# Patient Record
Sex: FEMALE | Race: BLACK OR AFRICAN AMERICAN | NOT HISPANIC OR LATINO | URBAN - METROPOLITAN AREA
[De-identification: names, ages, dates, MRNs, and addresses within clinical notes are randomized per-mention and may not be internally consistent; named-entity substitution may affect disease eponyms.]

---

## 2018-01-01 ENCOUNTER — INPATIENT (INPATIENT)
Facility: HOSPITAL | Age: 63
LOS: 12 days | End: 2019-01-02
Attending: INTERNAL MEDICINE | Admitting: INTERNAL MEDICINE
Payer: MEDICAID

## 2018-01-01 VITALS
TEMPERATURE: 98 F | OXYGEN SATURATION: 98 % | SYSTOLIC BLOOD PRESSURE: 101 MMHG | RESPIRATION RATE: 16 BRPM | HEART RATE: 93 BPM | DIASTOLIC BLOOD PRESSURE: 80 MMHG

## 2018-01-01 DIAGNOSIS — Z51.5 ENCOUNTER FOR PALLIATIVE CARE: ICD-10-CM

## 2018-01-01 DIAGNOSIS — R52 PAIN, UNSPECIFIED: ICD-10-CM

## 2018-01-01 DIAGNOSIS — C50.919 MALIGNANT NEOPLASM OF UNSPECIFIED SITE OF UNSPECIFIED FEMALE BREAST: ICD-10-CM

## 2018-01-01 DIAGNOSIS — Z29.9 ENCOUNTER FOR PROPHYLACTIC MEASURES, UNSPECIFIED: ICD-10-CM

## 2018-01-01 DIAGNOSIS — Z71.89 OTHER SPECIFIED COUNSELING: ICD-10-CM

## 2018-01-01 DIAGNOSIS — G93.41 METABOLIC ENCEPHALOPATHY: ICD-10-CM

## 2018-01-01 DIAGNOSIS — R65.10 SYSTEMIC INFLAMMATORY RESPONSE SYNDROME (SIRS) OF NON-INFECTIOUS ORIGIN WITHOUT ACUTE ORGAN DYSFUNCTION: ICD-10-CM

## 2018-01-01 DIAGNOSIS — A41.9 SEPSIS, UNSPECIFIED ORGANISM: ICD-10-CM

## 2018-01-01 DIAGNOSIS — Z79.899 OTHER LONG TERM (CURRENT) DRUG THERAPY: ICD-10-CM

## 2018-01-01 DIAGNOSIS — R74.8 ABNORMAL LEVELS OF OTHER SERUM ENZYMES: ICD-10-CM

## 2018-01-01 DIAGNOSIS — K59.00 CONSTIPATION, UNSPECIFIED: ICD-10-CM

## 2018-01-01 DIAGNOSIS — Z90.11 ACQUIRED ABSENCE OF RIGHT BREAST AND NIPPLE: Chronic | ICD-10-CM

## 2018-01-01 DIAGNOSIS — I10 ESSENTIAL (PRIMARY) HYPERTENSION: ICD-10-CM

## 2018-01-01 DIAGNOSIS — B37.0 CANDIDAL STOMATITIS: ICD-10-CM

## 2018-01-01 DIAGNOSIS — K59.01 SLOW TRANSIT CONSTIPATION: ICD-10-CM

## 2018-01-01 DIAGNOSIS — R53.1 WEAKNESS: ICD-10-CM

## 2018-01-01 LAB
-  AMIKACIN: SIGNIFICANT CHANGE UP
-  AMIKACIN: SIGNIFICANT CHANGE UP
-  AMPICILLIN/SULBACTAM: SIGNIFICANT CHANGE UP
-  AMPICILLIN/SULBACTAM: SIGNIFICANT CHANGE UP
-  AMPICILLIN: SIGNIFICANT CHANGE UP
-  AMPICILLIN: SIGNIFICANT CHANGE UP
-  AZTREONAM: SIGNIFICANT CHANGE UP
-  AZTREONAM: SIGNIFICANT CHANGE UP
-  CEFAZOLIN: SIGNIFICANT CHANGE UP
-  CEFAZOLIN: SIGNIFICANT CHANGE UP
-  CEFEPIME: SIGNIFICANT CHANGE UP
-  CEFEPIME: SIGNIFICANT CHANGE UP
-  CEFOXITIN: SIGNIFICANT CHANGE UP
-  CEFOXITIN: SIGNIFICANT CHANGE UP
-  CEFTAZIDIME: SIGNIFICANT CHANGE UP
-  CEFTAZIDIME: SIGNIFICANT CHANGE UP
-  CEFTRIAXONE: SIGNIFICANT CHANGE UP
-  CEFTRIAXONE: SIGNIFICANT CHANGE UP
-  CIPROFLOXACIN: SIGNIFICANT CHANGE UP
-  CIPROFLOXACIN: SIGNIFICANT CHANGE UP
-  ERTAPENEM: SIGNIFICANT CHANGE UP
-  ERTAPENEM: SIGNIFICANT CHANGE UP
-  GENTAMICIN: SIGNIFICANT CHANGE UP
-  GENTAMICIN: SIGNIFICANT CHANGE UP
-  IMIPENEM: SIGNIFICANT CHANGE UP
-  IMIPENEM: SIGNIFICANT CHANGE UP
-  LEVOFLOXACIN: SIGNIFICANT CHANGE UP
-  LEVOFLOXACIN: SIGNIFICANT CHANGE UP
-  MEROPENEM: SIGNIFICANT CHANGE UP
-  MEROPENEM: SIGNIFICANT CHANGE UP
-  NITROFURANTOIN: SIGNIFICANT CHANGE UP
-  PIPERACILLIN/TAZOBACTAM: SIGNIFICANT CHANGE UP
-  PIPERACILLIN/TAZOBACTAM: SIGNIFICANT CHANGE UP
-  TIGECYCLINE: SIGNIFICANT CHANGE UP
-  TIGECYCLINE: SIGNIFICANT CHANGE UP
-  TOBRAMYCIN: SIGNIFICANT CHANGE UP
-  TOBRAMYCIN: SIGNIFICANT CHANGE UP
-  TRIMETHOPRIM/SULFAMETHOXAZOLE: SIGNIFICANT CHANGE UP
-  TRIMETHOPRIM/SULFAMETHOXAZOLE: SIGNIFICANT CHANGE UP
ALBUMIN SERPL ELPH-MCNC: 1.7 G/DL — LOW (ref 3.3–5)
ALBUMIN SERPL ELPH-MCNC: 2 G/DL — LOW (ref 3.3–5)
ALBUMIN SERPL ELPH-MCNC: 2.1 G/DL — LOW (ref 3.3–5)
ALBUMIN SERPL ELPH-MCNC: 2.4 G/DL — LOW (ref 3.3–5)
ALBUMIN SERPL ELPH-MCNC: 2.6 G/DL — LOW (ref 3.3–5)
ALBUMIN SERPL ELPH-MCNC: 2.7 G/DL — LOW (ref 3.3–5)
ALBUMIN SERPL ELPH-MCNC: 3.1 G/DL — LOW (ref 3.3–5)
ALP SERPL-CCNC: 250 U/L — HIGH (ref 40–120)
ALP SERPL-CCNC: 281 U/L — HIGH (ref 40–120)
ALP SERPL-CCNC: 284 U/L — HIGH (ref 40–120)
ALP SERPL-CCNC: 284 U/L — HIGH (ref 40–120)
ALP SERPL-CCNC: 296 U/L — HIGH (ref 40–120)
ALP SERPL-CCNC: 304 U/L — HIGH (ref 40–120)
ALP SERPL-CCNC: 319 U/L — HIGH (ref 40–120)
ALP SERPL-CCNC: 330 U/L — HIGH (ref 40–120)
ALP SERPL-CCNC: 360 U/L — HIGH (ref 40–120)
ALP SERPL-CCNC: 424 U/L — HIGH (ref 40–120)
ALT FLD-CCNC: 420 U/L — HIGH (ref 4–33)
ALT FLD-CCNC: 422 U/L — HIGH (ref 4–33)
ALT FLD-CCNC: 436 U/L — HIGH (ref 4–33)
ALT FLD-CCNC: 475 U/L — HIGH (ref 4–33)
ALT FLD-CCNC: 495 U/L — HIGH (ref 4–33)
ALT FLD-CCNC: 505 U/L — HIGH (ref 4–33)
ALT FLD-CCNC: 543 U/L — HIGH (ref 4–33)
ALT FLD-CCNC: 645 U/L — HIGH (ref 4–33)
ALT FLD-CCNC: 687 U/L — HIGH (ref 4–33)
ALT FLD-CCNC: 755 U/L — HIGH (ref 4–33)
AMMONIA BLD-MCNC: 63 UMOL/L — HIGH (ref 11–55)
ANISOCYTOSIS BLD QL: SIGNIFICANT CHANGE UP
ANISOCYTOSIS BLD QL: SIGNIFICANT CHANGE UP
APPEARANCE UR: SIGNIFICANT CHANGE UP
AST SERPL-CCNC: 1068 U/L — HIGH (ref 4–32)
AST SERPL-CCNC: 1133 U/L — HIGH (ref 4–32)
AST SERPL-CCNC: 1268 U/L — HIGH (ref 4–32)
AST SERPL-CCNC: 426 U/L — HIGH (ref 4–32)
AST SERPL-CCNC: 440 U/L — HIGH (ref 4–32)
AST SERPL-CCNC: 642 U/L — HIGH (ref 4–32)
AST SERPL-CCNC: 762 U/L — HIGH (ref 4–32)
AST SERPL-CCNC: 777 U/L — HIGH (ref 4–32)
AST SERPL-CCNC: 882 U/L — HIGH (ref 4–32)
AST SERPL-CCNC: 883 U/L — HIGH (ref 4–32)
BACTERIA # UR AUTO: HIGH
BACTERIA BLD CULT: SIGNIFICANT CHANGE UP
BACTERIA UR CULT: SIGNIFICANT CHANGE UP
BASE EXCESS BLDV CALC-SCNC: -1.9 MMOL/L — SIGNIFICANT CHANGE UP
BASE EXCESS BLDV CALC-SCNC: -4.4 MMOL/L — SIGNIFICANT CHANGE UP
BASOPHILS # BLD AUTO: 0.05 K/UL — SIGNIFICANT CHANGE UP (ref 0–0.2)
BASOPHILS # BLD AUTO: 0.07 K/UL — SIGNIFICANT CHANGE UP (ref 0–0.2)
BASOPHILS # BLD AUTO: 0.09 K/UL — SIGNIFICANT CHANGE UP (ref 0–0.2)
BASOPHILS # BLD AUTO: 0.09 K/UL — SIGNIFICANT CHANGE UP (ref 0–0.2)
BASOPHILS # BLD AUTO: 0.12 K/UL — SIGNIFICANT CHANGE UP (ref 0–0.2)
BASOPHILS # BLD AUTO: 0.13 K/UL — SIGNIFICANT CHANGE UP (ref 0–0.2)
BASOPHILS NFR BLD AUTO: 0.4 % — SIGNIFICANT CHANGE UP (ref 0–2)
BASOPHILS NFR BLD AUTO: 0.4 % — SIGNIFICANT CHANGE UP (ref 0–2)
BASOPHILS NFR BLD AUTO: 0.6 % — SIGNIFICANT CHANGE UP (ref 0–2)
BASOPHILS NFR BLD AUTO: 0.7 % — SIGNIFICANT CHANGE UP (ref 0–2)
BASOPHILS NFR BLD AUTO: 0.9 % — SIGNIFICANT CHANGE UP (ref 0–2)
BASOPHILS NFR BLD AUTO: 1.1 % — SIGNIFICANT CHANGE UP (ref 0–2)
BASOPHILS NFR SPEC: 0 % — SIGNIFICANT CHANGE UP (ref 0–2)
BASOPHILS NFR SPEC: 0 % — SIGNIFICANT CHANGE UP (ref 0–2)
BILIRUB DIRECT SERPL-MCNC: 3.6 MG/DL — HIGH (ref 0.1–0.2)
BILIRUB DIRECT SERPL-MCNC: 6.3 MG/DL — HIGH (ref 0.1–0.2)
BILIRUB SERPL-MCNC: 10.4 MG/DL — HIGH (ref 0.2–1.2)
BILIRUB SERPL-MCNC: 12.7 MG/DL — HIGH (ref 0.2–1.2)
BILIRUB SERPL-MCNC: 14 MG/DL — HIGH (ref 0.2–1.2)
BILIRUB SERPL-MCNC: 5.2 MG/DL — HIGH (ref 0.2–1.2)
BILIRUB SERPL-MCNC: 6.6 MG/DL — HIGH (ref 0.2–1.2)
BILIRUB SERPL-MCNC: 7.7 MG/DL — HIGH (ref 0.2–1.2)
BILIRUB SERPL-MCNC: 7.8 MG/DL — HIGH (ref 0.2–1.2)
BILIRUB SERPL-MCNC: 8.5 MG/DL — HIGH (ref 0.2–1.2)
BILIRUB SERPL-MCNC: 9.9 MG/DL — HIGH (ref 0.2–1.2)
BILIRUB UR-MCNC: SIGNIFICANT CHANGE UP
BLASTS # FLD: 0 % — SIGNIFICANT CHANGE UP (ref 0–0)
BLASTS # FLD: 0 % — SIGNIFICANT CHANGE UP (ref 0–0)
BLOOD GAS VENOUS - CREATININE: 0.58 MG/DL — SIGNIFICANT CHANGE UP (ref 0.5–1.3)
BLOOD GAS VENOUS - CREATININE: 0.79 MG/DL — SIGNIFICANT CHANGE UP (ref 0.5–1.3)
BLOOD UR QL VISUAL: SIGNIFICANT CHANGE UP
BUN SERPL-MCNC: 20 MG/DL — SIGNIFICANT CHANGE UP (ref 7–23)
BUN SERPL-MCNC: 24 MG/DL — HIGH (ref 7–23)
BUN SERPL-MCNC: 24 MG/DL — HIGH (ref 7–23)
BUN SERPL-MCNC: 27 MG/DL — HIGH (ref 7–23)
BUN SERPL-MCNC: 27 MG/DL — HIGH (ref 7–23)
BUN SERPL-MCNC: 31 MG/DL — HIGH (ref 7–23)
BUN SERPL-MCNC: 32 MG/DL — HIGH (ref 7–23)
BUN SERPL-MCNC: 36 MG/DL — HIGH (ref 7–23)
BUN SERPL-MCNC: 41 MG/DL — HIGH (ref 7–23)
BUN SERPL-MCNC: 45 MG/DL — HIGH (ref 7–23)
BURR CELLS BLD QL SMEAR: PRESENT — SIGNIFICANT CHANGE UP
CALCIUM SERPL-MCNC: 10 MG/DL — SIGNIFICANT CHANGE UP (ref 8.4–10.5)
CALCIUM SERPL-MCNC: 7.8 MG/DL — LOW (ref 8.4–10.5)
CALCIUM SERPL-MCNC: 8.6 MG/DL — SIGNIFICANT CHANGE UP (ref 8.4–10.5)
CALCIUM SERPL-MCNC: 8.7 MG/DL — SIGNIFICANT CHANGE UP (ref 8.4–10.5)
CALCIUM SERPL-MCNC: 8.8 MG/DL — SIGNIFICANT CHANGE UP (ref 8.4–10.5)
CALCIUM SERPL-MCNC: 9 MG/DL — SIGNIFICANT CHANGE UP (ref 8.4–10.5)
CALCIUM SERPL-MCNC: 9 MG/DL — SIGNIFICANT CHANGE UP (ref 8.4–10.5)
CALCIUM SERPL-MCNC: 9.5 MG/DL — SIGNIFICANT CHANGE UP (ref 8.4–10.5)
CHLORIDE BLDV-SCNC: 106 MMOL/L — SIGNIFICANT CHANGE UP (ref 96–108)
CHLORIDE BLDV-SCNC: 108 MMOL/L — SIGNIFICANT CHANGE UP (ref 96–108)
CHLORIDE SERPL-SCNC: 101 MMOL/L — SIGNIFICANT CHANGE UP (ref 98–107)
CHLORIDE SERPL-SCNC: 102 MMOL/L — SIGNIFICANT CHANGE UP (ref 98–107)
CHLORIDE SERPL-SCNC: 104 MMOL/L — SIGNIFICANT CHANGE UP (ref 98–107)
CHLORIDE SERPL-SCNC: 105 MMOL/L — SIGNIFICANT CHANGE UP (ref 98–107)
CHLORIDE SERPL-SCNC: 105 MMOL/L — SIGNIFICANT CHANGE UP (ref 98–107)
CHLORIDE SERPL-SCNC: 109 MMOL/L — HIGH (ref 98–107)
CHLORIDE SERPL-SCNC: 97 MMOL/L — LOW (ref 98–107)
CHLORIDE SERPL-SCNC: 99 MMOL/L — SIGNIFICANT CHANGE UP (ref 98–107)
CMV DNA CSF QL NAA+PROBE: 2230 IU/ML — HIGH
CMV DNA SPEC NAA+PROBE-LOG#: 3.35 LOGIU/ML — HIGH
CMV IGG FLD QL: 8.6 U/ML — HIGH
CMV IGG SERPL-IMP: POSITIVE — SIGNIFICANT CHANGE UP
CMV IGM FLD-ACNC: <8 AU/ML — SIGNIFICANT CHANGE UP
CMV IGM SERPL QL: NEGATIVE — SIGNIFICANT CHANGE UP
CO2 SERPL-SCNC: 11 MMOL/L — LOW (ref 22–31)
CO2 SERPL-SCNC: 13 MMOL/L — LOW (ref 22–31)
CO2 SERPL-SCNC: 16 MMOL/L — LOW (ref 22–31)
CO2 SERPL-SCNC: 17 MMOL/L — LOW (ref 22–31)
CO2 SERPL-SCNC: 20 MMOL/L — LOW (ref 22–31)
CO2 SERPL-SCNC: 21 MMOL/L — LOW (ref 22–31)
CO2 SERPL-SCNC: 23 MMOL/L — SIGNIFICANT CHANGE UP (ref 22–31)
CO2 SERPL-SCNC: 25 MMOL/L — SIGNIFICANT CHANGE UP (ref 22–31)
COLOR SPEC: SIGNIFICANT CHANGE UP
CREAT SERPL-MCNC: 0.69 MG/DL — SIGNIFICANT CHANGE UP (ref 0.5–1.3)
CREAT SERPL-MCNC: 0.69 MG/DL — SIGNIFICANT CHANGE UP (ref 0.5–1.3)
CREAT SERPL-MCNC: 0.73 MG/DL — SIGNIFICANT CHANGE UP (ref 0.5–1.3)
CREAT SERPL-MCNC: 0.75 MG/DL — SIGNIFICANT CHANGE UP (ref 0.5–1.3)
CREAT SERPL-MCNC: 0.83 MG/DL — SIGNIFICANT CHANGE UP (ref 0.5–1.3)
CREAT SERPL-MCNC: 0.85 MG/DL — SIGNIFICANT CHANGE UP (ref 0.5–1.3)
CREAT SERPL-MCNC: 0.86 MG/DL — SIGNIFICANT CHANGE UP (ref 0.5–1.3)
CREAT SERPL-MCNC: 0.89 MG/DL — SIGNIFICANT CHANGE UP (ref 0.5–1.3)
CREAT SERPL-MCNC: 0.99 MG/DL — SIGNIFICANT CHANGE UP (ref 0.5–1.3)
CREAT SERPL-MCNC: 1.12 MG/DL — SIGNIFICANT CHANGE UP (ref 0.5–1.3)
E COLI DNA BLD POS QL NAA+NON-PROBE: SIGNIFICANT CHANGE UP
EBV EA AB TITR SER IF: POSITIVE — SIGNIFICANT CHANGE UP
EBV EA IGG SER-ACNC: NEGATIVE — SIGNIFICANT CHANGE UP
EBV PATRN SPEC IB-IMP: SIGNIFICANT CHANGE UP
EBV VCA IGG AVIDITY SER QL IA: POSITIVE — SIGNIFICANT CHANGE UP
EBV VCA IGM TITR FLD: NEGATIVE — SIGNIFICANT CHANGE UP
EOSINOPHIL # BLD AUTO: 0 K/UL — SIGNIFICANT CHANGE UP (ref 0–0.5)
EOSINOPHIL # BLD AUTO: 0.05 K/UL — SIGNIFICANT CHANGE UP (ref 0–0.5)
EOSINOPHIL # BLD AUTO: 0.13 K/UL — SIGNIFICANT CHANGE UP (ref 0–0.5)
EOSINOPHIL # BLD AUTO: 0.14 K/UL — SIGNIFICANT CHANGE UP (ref 0–0.5)
EOSINOPHIL # BLD AUTO: 0.15 K/UL — SIGNIFICANT CHANGE UP (ref 0–0.5)
EOSINOPHIL # BLD AUTO: 0.25 K/UL — SIGNIFICANT CHANGE UP (ref 0–0.5)
EOSINOPHIL NFR BLD AUTO: 0 % — SIGNIFICANT CHANGE UP (ref 0–6)
EOSINOPHIL NFR BLD AUTO: 0.4 % — SIGNIFICANT CHANGE UP (ref 0–6)
EOSINOPHIL NFR BLD AUTO: 1 % — SIGNIFICANT CHANGE UP (ref 0–6)
EOSINOPHIL NFR BLD AUTO: 1.2 % — SIGNIFICANT CHANGE UP (ref 0–6)
EOSINOPHIL NFR BLD AUTO: 1.2 % — SIGNIFICANT CHANGE UP (ref 0–6)
EOSINOPHIL NFR BLD AUTO: 1.9 % — SIGNIFICANT CHANGE UP (ref 0–6)
EOSINOPHIL NFR FLD: 0 % — SIGNIFICANT CHANGE UP (ref 0–6)
EOSINOPHIL NFR FLD: 2 % — SIGNIFICANT CHANGE UP (ref 0–6)
GAS PNL BLDV: 135 MMOL/L — LOW (ref 136–146)
GAS PNL BLDV: 137 MMOL/L — SIGNIFICANT CHANGE UP (ref 136–146)
GIANT PLATELETS BLD QL SMEAR: PRESENT — SIGNIFICANT CHANGE UP
GIANT PLATELETS BLD QL SMEAR: PRESENT — SIGNIFICANT CHANGE UP
GLUCOSE BLDV-MCNC: 149 — HIGH (ref 70–99)
GLUCOSE BLDV-MCNC: 185 — HIGH (ref 70–99)
GLUCOSE SERPL-MCNC: 105 MG/DL — HIGH (ref 70–99)
GLUCOSE SERPL-MCNC: 118 MG/DL — HIGH (ref 70–99)
GLUCOSE SERPL-MCNC: 120 MG/DL — HIGH (ref 70–99)
GLUCOSE SERPL-MCNC: 127 MG/DL — HIGH (ref 70–99)
GLUCOSE SERPL-MCNC: 136 MG/DL — HIGH (ref 70–99)
GLUCOSE SERPL-MCNC: 137 MG/DL — HIGH (ref 70–99)
GLUCOSE SERPL-MCNC: 144 MG/DL — HIGH (ref 70–99)
GLUCOSE SERPL-MCNC: 150 MG/DL — HIGH (ref 70–99)
GLUCOSE SERPL-MCNC: 185 MG/DL — HIGH (ref 70–99)
GLUCOSE SERPL-MCNC: 576 MG/DL — CRITICAL HIGH (ref 70–99)
GLUCOSE UR-MCNC: NEGATIVE — SIGNIFICANT CHANGE UP
HAV IGM SER-ACNC: NONREACTIVE — SIGNIFICANT CHANGE UP
HBV CORE IGM SER-ACNC: NONREACTIVE — SIGNIFICANT CHANGE UP
HBV SURFACE AG SER-ACNC: NONREACTIVE — SIGNIFICANT CHANGE UP
HCO3 BLDV-SCNC: 21 MMOL/L — SIGNIFICANT CHANGE UP (ref 20–27)
HCO3 BLDV-SCNC: 23 MMOL/L — SIGNIFICANT CHANGE UP (ref 20–27)
HCT VFR BLD CALC: 31.5 % — LOW (ref 34.5–45)
HCT VFR BLD CALC: 32.4 % — LOW (ref 34.5–45)
HCT VFR BLD CALC: 35 % — SIGNIFICANT CHANGE UP (ref 34.5–45)
HCT VFR BLD CALC: 36 % — SIGNIFICANT CHANGE UP (ref 34.5–45)
HCT VFR BLD CALC: 37.1 % — SIGNIFICANT CHANGE UP (ref 34.5–45)
HCT VFR BLD CALC: 37.5 % — SIGNIFICANT CHANGE UP (ref 34.5–45)
HCT VFR BLD CALC: 37.7 % — SIGNIFICANT CHANGE UP (ref 34.5–45)
HCT VFR BLD CALC: 42.1 % — SIGNIFICANT CHANGE UP (ref 34.5–45)
HCT VFR BLDV CALC: 38.6 % — SIGNIFICANT CHANGE UP (ref 34.5–45)
HCT VFR BLDV CALC: 44.3 % — SIGNIFICANT CHANGE UP (ref 34.5–45)
HCV AB S/CO SERPL IA: 0.11 S/CO — SIGNIFICANT CHANGE UP
HCV AB SERPL-IMP: SIGNIFICANT CHANGE UP
HGB BLD-MCNC: 11 G/DL — LOW (ref 11.5–15.5)
HGB BLD-MCNC: 11.7 G/DL — SIGNIFICANT CHANGE UP (ref 11.5–15.5)
HGB BLD-MCNC: 12.3 G/DL — SIGNIFICANT CHANGE UP (ref 11.5–15.5)
HGB BLD-MCNC: 12.7 G/DL — SIGNIFICANT CHANGE UP (ref 11.5–15.5)
HGB BLD-MCNC: 13 G/DL — SIGNIFICANT CHANGE UP (ref 11.5–15.5)
HGB BLD-MCNC: 13.4 G/DL — SIGNIFICANT CHANGE UP (ref 11.5–15.5)
HGB BLD-MCNC: 13.6 G/DL — SIGNIFICANT CHANGE UP (ref 11.5–15.5)
HGB BLD-MCNC: 14.7 G/DL — SIGNIFICANT CHANGE UP (ref 11.5–15.5)
HGB BLDV-MCNC: 12.5 G/DL — SIGNIFICANT CHANGE UP (ref 11.5–15.5)
HGB BLDV-MCNC: 14.4 G/DL — SIGNIFICANT CHANGE UP (ref 11.5–15.5)
IMM GRANULOCYTES # BLD AUTO: 0.31 # — SIGNIFICANT CHANGE UP
IMM GRANULOCYTES # BLD AUTO: 0.39 # — SIGNIFICANT CHANGE UP
IMM GRANULOCYTES # BLD AUTO: 0.42 # — SIGNIFICANT CHANGE UP
IMM GRANULOCYTES # BLD AUTO: 0.43 # — SIGNIFICANT CHANGE UP
IMM GRANULOCYTES # BLD AUTO: 0.49 # — SIGNIFICANT CHANGE UP
IMM GRANULOCYTES # BLD AUTO: 0.71 # — SIGNIFICANT CHANGE UP
IMM GRANULOCYTES NFR BLD AUTO: 2.6 % — HIGH (ref 0–1.5)
IMM GRANULOCYTES NFR BLD AUTO: 3.1 % — HIGH (ref 0–1.5)
IMM GRANULOCYTES NFR BLD AUTO: 3.3 % — HIGH (ref 0–1.5)
IMM GRANULOCYTES NFR BLD AUTO: 3.3 % — HIGH (ref 0–1.5)
IMM GRANULOCYTES NFR BLD AUTO: 3.4 % — HIGH (ref 0–1.5)
IMM GRANULOCYTES NFR BLD AUTO: 3.7 % — HIGH (ref 0–1.5)
KETONES UR-MCNC: SIGNIFICANT CHANGE UP
LACTATE BLDV-MCNC: 12.2 MMOL/L — CRITICAL HIGH (ref 0.5–2)
LACTATE BLDV-MCNC: 13 MMOL/L — CRITICAL HIGH (ref 0.5–2)
LACTATE SERPL-SCNC: 11 MMOL/L — CRITICAL HIGH (ref 0.5–2)
LACTATE SERPL-SCNC: 6.4 MMOL/L — CRITICAL HIGH (ref 0.5–2)
LACTATE SERPL-SCNC: 8 MMOL/L — CRITICAL HIGH (ref 0.5–2)
LACTATE SERPL-SCNC: 9.3 MMOL/L — CRITICAL HIGH (ref 0.5–2)
LACTATE SERPL-SCNC: 9.7 MMOL/L — CRITICAL HIGH (ref 0.5–2)
LDH SERPL L TO P-CCNC: > 1800 U/L — HIGH (ref 135–225)
LEUKOCYTE ESTERASE UR-ACNC: SIGNIFICANT CHANGE UP
LYMPHOCYTES # BLD AUTO: 1.25 K/UL — SIGNIFICANT CHANGE UP (ref 1–3.3)
LYMPHOCYTES # BLD AUTO: 1.29 K/UL — SIGNIFICANT CHANGE UP (ref 1–3.3)
LYMPHOCYTES # BLD AUTO: 1.47 K/UL — SIGNIFICANT CHANGE UP (ref 1–3.3)
LYMPHOCYTES # BLD AUTO: 1.51 K/UL — SIGNIFICANT CHANGE UP (ref 1–3.3)
LYMPHOCYTES # BLD AUTO: 1.64 K/UL — SIGNIFICANT CHANGE UP (ref 1–3.3)
LYMPHOCYTES # BLD AUTO: 1.78 K/UL — SIGNIFICANT CHANGE UP (ref 1–3.3)
LYMPHOCYTES # BLD AUTO: 11.5 % — LOW (ref 13–44)
LYMPHOCYTES # BLD AUTO: 11.5 % — LOW (ref 13–44)
LYMPHOCYTES # BLD AUTO: 13.6 % — SIGNIFICANT CHANGE UP (ref 13–44)
LYMPHOCYTES # BLD AUTO: 14 % — SIGNIFICANT CHANGE UP (ref 13–44)
LYMPHOCYTES # BLD AUTO: 6 % — LOW (ref 13–44)
LYMPHOCYTES # BLD AUTO: 9.6 % — LOW (ref 13–44)
LYMPHOCYTES NFR SPEC AUTO: 2.7 % — LOW (ref 13–44)
LYMPHOCYTES NFR SPEC AUTO: 3 % — LOW (ref 13–44)
MACROCYTES BLD QL: SIGNIFICANT CHANGE UP
MACROCYTES BLD QL: SIGNIFICANT CHANGE UP
MAGNESIUM SERPL-MCNC: 2.3 MG/DL — SIGNIFICANT CHANGE UP (ref 1.6–2.6)
MAGNESIUM SERPL-MCNC: 2.3 MG/DL — SIGNIFICANT CHANGE UP (ref 1.6–2.6)
MAGNESIUM SERPL-MCNC: 2.4 MG/DL — SIGNIFICANT CHANGE UP (ref 1.6–2.6)
MAGNESIUM SERPL-MCNC: 2.4 MG/DL — SIGNIFICANT CHANGE UP (ref 1.6–2.6)
MANUAL SMEAR VERIFICATION: SIGNIFICANT CHANGE UP
MCHC RBC-ENTMCNC: 26.2 PG — LOW (ref 27–34)
MCHC RBC-ENTMCNC: 26.4 PG — LOW (ref 27–34)
MCHC RBC-ENTMCNC: 26.4 PG — LOW (ref 27–34)
MCHC RBC-ENTMCNC: 26.5 PG — LOW (ref 27–34)
MCHC RBC-ENTMCNC: 26.6 PG — LOW (ref 27–34)
MCHC RBC-ENTMCNC: 26.7 PG — LOW (ref 27–34)
MCHC RBC-ENTMCNC: 26.8 PG — LOW (ref 27–34)
MCHC RBC-ENTMCNC: 27.1 PG — SIGNIFICANT CHANGE UP (ref 27–34)
MCHC RBC-ENTMCNC: 34.2 % — SIGNIFICANT CHANGE UP (ref 32–36)
MCHC RBC-ENTMCNC: 34.9 % — SIGNIFICANT CHANGE UP (ref 32–36)
MCHC RBC-ENTMCNC: 34.9 % — SIGNIFICANT CHANGE UP (ref 32–36)
MCHC RBC-ENTMCNC: 35.1 % — SIGNIFICANT CHANGE UP (ref 32–36)
MCHC RBC-ENTMCNC: 35.7 % — SIGNIFICANT CHANGE UP (ref 32–36)
MCHC RBC-ENTMCNC: 36.1 % — HIGH (ref 32–36)
MCV RBC AUTO: 73.3 FL — LOW (ref 80–100)
MCV RBC AUTO: 74.1 FL — LOW (ref 80–100)
MCV RBC AUTO: 75 FL — LOW (ref 80–100)
MCV RBC AUTO: 75.1 FL — LOW (ref 80–100)
MCV RBC AUTO: 75.2 FL — LOW (ref 80–100)
MCV RBC AUTO: 75.7 FL — LOW (ref 80–100)
MCV RBC AUTO: 76.1 FL — LOW (ref 80–100)
MCV RBC AUTO: 76.5 FL — LOW (ref 80–100)
METAMYELOCYTES # FLD: 0 % — SIGNIFICANT CHANGE UP (ref 0–1)
METAMYELOCYTES # FLD: 0 % — SIGNIFICANT CHANGE UP (ref 0–1)
METHOD TYPE: SIGNIFICANT CHANGE UP
MICROCYTES BLD QL: SLIGHT — SIGNIFICANT CHANGE UP
MONOCYTES # BLD AUTO: 0.85 K/UL — SIGNIFICANT CHANGE UP (ref 0–0.9)
MONOCYTES # BLD AUTO: 0.98 K/UL — HIGH (ref 0–0.9)
MONOCYTES # BLD AUTO: 1.01 K/UL — HIGH (ref 0–0.9)
MONOCYTES # BLD AUTO: 1.12 K/UL — HIGH (ref 0–0.9)
MONOCYTES # BLD AUTO: 1.25 K/UL — HIGH (ref 0–0.9)
MONOCYTES # BLD AUTO: 1.95 K/UL — HIGH (ref 0–0.9)
MONOCYTES NFR BLD AUTO: 6.7 % — SIGNIFICANT CHANGE UP (ref 2–14)
MONOCYTES NFR BLD AUTO: 7.3 % — SIGNIFICANT CHANGE UP (ref 2–14)
MONOCYTES NFR BLD AUTO: 7.7 % — SIGNIFICANT CHANGE UP (ref 2–14)
MONOCYTES NFR BLD AUTO: 9.3 % — SIGNIFICANT CHANGE UP (ref 2–14)
MONOCYTES NFR BLD AUTO: 9.4 % — SIGNIFICANT CHANGE UP (ref 2–14)
MONOCYTES NFR BLD AUTO: 9.8 % — SIGNIFICANT CHANGE UP (ref 2–14)
MONOCYTES NFR BLD: 1 % — LOW (ref 2–9)
MONOCYTES NFR BLD: 8 % — SIGNIFICANT CHANGE UP (ref 2–9)
MYELOCYTES NFR BLD: 0 % — SIGNIFICANT CHANGE UP (ref 0–0)
MYELOCYTES NFR BLD: 0 % — SIGNIFICANT CHANGE UP (ref 0–0)
NEUTROPHIL AB SER-ACNC: 89.3 % — HIGH (ref 43–77)
NEUTROPHIL AB SER-ACNC: 94 % — HIGH (ref 43–77)
NEUTROPHILS # BLD AUTO: 10.32 K/UL — HIGH (ref 1.8–7.4)
NEUTROPHILS # BLD AUTO: 16.71 K/UL — HIGH (ref 1.8–7.4)
NEUTROPHILS # BLD AUTO: 8.75 K/UL — HIGH (ref 1.8–7.4)
NEUTROPHILS # BLD AUTO: 9.45 K/UL — HIGH (ref 1.8–7.4)
NEUTROPHILS # BLD AUTO: 9.53 K/UL — HIGH (ref 1.8–7.4)
NEUTROPHILS # BLD AUTO: 9.96 K/UL — HIGH (ref 1.8–7.4)
NEUTROPHILS NFR BLD AUTO: 72.2 % — SIGNIFICANT CHANGE UP (ref 43–77)
NEUTROPHILS NFR BLD AUTO: 74.4 % — SIGNIFICANT CHANGE UP (ref 43–77)
NEUTROPHILS NFR BLD AUTO: 74.6 % — SIGNIFICANT CHANGE UP (ref 43–77)
NEUTROPHILS NFR BLD AUTO: 75.6 % — SIGNIFICANT CHANGE UP (ref 43–77)
NEUTROPHILS NFR BLD AUTO: 76.8 % — SIGNIFICANT CHANGE UP (ref 43–77)
NEUTROPHILS NFR BLD AUTO: 80.8 % — HIGH (ref 43–77)
NEUTS BAND # BLD: 0 % — SIGNIFICANT CHANGE UP (ref 0–6)
NEUTS BAND # BLD: 0 % — SIGNIFICANT CHANGE UP (ref 0–6)
NITRITE UR-MCNC: NEGATIVE — SIGNIFICANT CHANGE UP
NRBC # BLD: 211.9 /100WBC — SIGNIFICANT CHANGE UP
NRBC # BLD: 49.1 /100WBC — SIGNIFICANT CHANGE UP
NRBC # FLD: 11.03 — SIGNIFICANT CHANGE UP
NRBC # FLD: 11.39 — SIGNIFICANT CHANGE UP
NRBC # FLD: 15.7 — SIGNIFICANT CHANGE UP
NRBC # FLD: 16.96 — SIGNIFICANT CHANGE UP
NRBC # FLD: 17.52 — SIGNIFICANT CHANGE UP
NRBC # FLD: 5.69 — SIGNIFICANT CHANGE UP
NRBC # FLD: 6.51 — SIGNIFICANT CHANGE UP
NRBC # FLD: 7.37 — SIGNIFICANT CHANGE UP
NRBC FLD-RTO: 129.8 — SIGNIFICANT CHANGE UP
NRBC FLD-RTO: 133.1 — SIGNIFICANT CHANGE UP
NRBC FLD-RTO: 133.6 — SIGNIFICANT CHANGE UP
NRBC FLD-RTO: 31.4 — SIGNIFICANT CHANGE UP
NRBC FLD-RTO: 44.6 — SIGNIFICANT CHANGE UP
NRBC FLD-RTO: 54.9 — SIGNIFICANT CHANGE UP
NRBC FLD-RTO: 72.6 — SIGNIFICANT CHANGE UP
NRBC FLD-RTO: 94.2 — SIGNIFICANT CHANGE UP
ORGANISM # SPEC MICROSCOPIC CNT: SIGNIFICANT CHANGE UP
OTHER - HEMATOLOGY %: 0 — SIGNIFICANT CHANGE UP
OTHER - HEMATOLOGY %: 0 — SIGNIFICANT CHANGE UP
PCO2 BLDV: 29 MMHG — LOW (ref 41–51)
PCO2 BLDV: 34 MMHG — LOW (ref 41–51)
PH BLDV: 7.43 PH — SIGNIFICANT CHANGE UP (ref 7.32–7.43)
PH BLDV: 7.43 PH — SIGNIFICANT CHANGE UP (ref 7.32–7.43)
PH UR: 6 — SIGNIFICANT CHANGE UP (ref 5–8)
PHOSPHATE SERPL-MCNC: 2.2 MG/DL — LOW (ref 2.5–4.5)
PHOSPHATE SERPL-MCNC: 2.6 MG/DL — SIGNIFICANT CHANGE UP (ref 2.5–4.5)
PHOSPHATE SERPL-MCNC: 2.7 MG/DL — SIGNIFICANT CHANGE UP (ref 2.5–4.5)
PHOSPHATE SERPL-MCNC: 3 MG/DL — SIGNIFICANT CHANGE UP (ref 2.5–4.5)
PLATELET # BLD AUTO: 29 K/UL — LOW (ref 150–400)
PLATELET # BLD AUTO: 31 K/UL — LOW (ref 150–400)
PLATELET # BLD AUTO: 36 K/UL — LOW (ref 150–400)
PLATELET # BLD AUTO: 37 K/UL — LOW (ref 150–400)
PLATELET # BLD AUTO: 42 K/UL — LOW (ref 150–400)
PLATELET # BLD AUTO: 47 K/UL — LOW (ref 150–400)
PLATELET # BLD AUTO: 69 K/UL — LOW (ref 150–400)
PLATELET # BLD AUTO: 91 K/UL — LOW (ref 150–400)
PLATELET COUNT - ESTIMATE: SIGNIFICANT CHANGE UP
PLATELET COUNT - ESTIMATE: SIGNIFICANT CHANGE UP
PMV BLD: SIGNIFICANT CHANGE UP FL (ref 7–13)
PO2 BLDV: 55 MMHG — HIGH (ref 35–40)
PO2 BLDV: 74 MMHG — HIGH (ref 35–40)
POIKILOCYTOSIS BLD QL AUTO: SIGNIFICANT CHANGE UP
POIKILOCYTOSIS BLD QL AUTO: SLIGHT — SIGNIFICANT CHANGE UP
POLYCHROMASIA BLD QL SMEAR: SIGNIFICANT CHANGE UP
POTASSIUM BLDV-SCNC: 4.3 MMOL/L — SIGNIFICANT CHANGE UP (ref 3.4–4.5)
POTASSIUM BLDV-SCNC: 4.4 MMOL/L — SIGNIFICANT CHANGE UP (ref 3.4–4.5)
POTASSIUM SERPL-MCNC: 3.4 MMOL/L — LOW (ref 3.5–5.3)
POTASSIUM SERPL-MCNC: 3.7 MMOL/L — SIGNIFICANT CHANGE UP (ref 3.5–5.3)
POTASSIUM SERPL-MCNC: 3.8 MMOL/L — SIGNIFICANT CHANGE UP (ref 3.5–5.3)
POTASSIUM SERPL-MCNC: 3.8 MMOL/L — SIGNIFICANT CHANGE UP (ref 3.5–5.3)
POTASSIUM SERPL-MCNC: 3.9 MMOL/L — SIGNIFICANT CHANGE UP (ref 3.5–5.3)
POTASSIUM SERPL-MCNC: 4 MMOL/L — SIGNIFICANT CHANGE UP (ref 3.5–5.3)
POTASSIUM SERPL-MCNC: 4.1 MMOL/L — SIGNIFICANT CHANGE UP (ref 3.5–5.3)
POTASSIUM SERPL-MCNC: 4.5 MMOL/L — SIGNIFICANT CHANGE UP (ref 3.5–5.3)
POTASSIUM SERPL-MCNC: 4.8 MMOL/L — SIGNIFICANT CHANGE UP (ref 3.5–5.3)
POTASSIUM SERPL-MCNC: 4.9 MMOL/L — SIGNIFICANT CHANGE UP (ref 3.5–5.3)
POTASSIUM SERPL-SCNC: 3.4 MMOL/L — LOW (ref 3.5–5.3)
POTASSIUM SERPL-SCNC: 3.7 MMOL/L — SIGNIFICANT CHANGE UP (ref 3.5–5.3)
POTASSIUM SERPL-SCNC: 3.8 MMOL/L — SIGNIFICANT CHANGE UP (ref 3.5–5.3)
POTASSIUM SERPL-SCNC: 3.8 MMOL/L — SIGNIFICANT CHANGE UP (ref 3.5–5.3)
POTASSIUM SERPL-SCNC: 3.9 MMOL/L — SIGNIFICANT CHANGE UP (ref 3.5–5.3)
POTASSIUM SERPL-SCNC: 4 MMOL/L — SIGNIFICANT CHANGE UP (ref 3.5–5.3)
POTASSIUM SERPL-SCNC: 4.1 MMOL/L — SIGNIFICANT CHANGE UP (ref 3.5–5.3)
POTASSIUM SERPL-SCNC: 4.5 MMOL/L — SIGNIFICANT CHANGE UP (ref 3.5–5.3)
POTASSIUM SERPL-SCNC: 4.8 MMOL/L — SIGNIFICANT CHANGE UP (ref 3.5–5.3)
POTASSIUM SERPL-SCNC: 4.9 MMOL/L — SIGNIFICANT CHANGE UP (ref 3.5–5.3)
PROMYELOCYTES # FLD: 0 % — SIGNIFICANT CHANGE UP (ref 0–0)
PROMYELOCYTES # FLD: 0 % — SIGNIFICANT CHANGE UP (ref 0–0)
PROT SERPL-MCNC: 4.2 G/DL — LOW (ref 6–8.3)
PROT SERPL-MCNC: 4.8 G/DL — LOW (ref 6–8.3)
PROT SERPL-MCNC: 4.9 G/DL — LOW (ref 6–8.3)
PROT SERPL-MCNC: 5 G/DL — LOW (ref 6–8.3)
PROT SERPL-MCNC: 5.1 G/DL — LOW (ref 6–8.3)
PROT SERPL-MCNC: 5.3 G/DL — LOW (ref 6–8.3)
PROT SERPL-MCNC: 5.6 G/DL — LOW (ref 6–8.3)
PROT SERPL-MCNC: 6.5 G/DL — SIGNIFICANT CHANGE UP (ref 6–8.3)
PROT UR-MCNC: 20 — SIGNIFICANT CHANGE UP
RBC # BLD: 4.16 M/UL — SIGNIFICANT CHANGE UP (ref 3.8–5.2)
RBC # BLD: 4.42 M/UL — SIGNIFICANT CHANGE UP (ref 3.8–5.2)
RBC # BLD: 4.66 M/UL — SIGNIFICANT CHANGE UP (ref 3.8–5.2)
RBC # BLD: 4.85 M/UL — SIGNIFICANT CHANGE UP (ref 3.8–5.2)
RBC # BLD: 4.86 M/UL — SIGNIFICANT CHANGE UP (ref 3.8–5.2)
RBC # BLD: 5 M/UL — SIGNIFICANT CHANGE UP (ref 3.8–5.2)
RBC # BLD: 5.01 M/UL — SIGNIFICANT CHANGE UP (ref 3.8–5.2)
RBC # BLD: 5.53 M/UL — HIGH (ref 3.8–5.2)
RBC # FLD: 21.4 % — HIGH (ref 10.3–14.5)
RBC # FLD: 21.8 % — HIGH (ref 10.3–14.5)
RBC # FLD: 21.9 % — HIGH (ref 10.3–14.5)
RBC # FLD: 23 % — HIGH (ref 10.3–14.5)
RBC # FLD: 23.9 % — HIGH (ref 10.3–14.5)
RBC # FLD: 24.2 % — HIGH (ref 10.3–14.5)
RBC # FLD: 24.4 % — HIGH (ref 10.3–14.5)
RBC # FLD: 24.9 % — HIGH (ref 10.3–14.5)
RBC CASTS # UR COMP ASSIST: HIGH (ref 0–?)
SAO2 % BLDV: 85.4 % — HIGH (ref 60–85)
SAO2 % BLDV: 94 % — HIGH (ref 60–85)
SCHISTOCYTES BLD QL AUTO: SLIGHT — SIGNIFICANT CHANGE UP
SMUDGE CELLS # BLD: PRESENT — SIGNIFICANT CHANGE UP
SMUDGE CELLS # BLD: PRESENT — SIGNIFICANT CHANGE UP
SODIUM SERPL-SCNC: 138 MMOL/L — SIGNIFICANT CHANGE UP (ref 135–145)
SODIUM SERPL-SCNC: 139 MMOL/L — SIGNIFICANT CHANGE UP (ref 135–145)
SODIUM SERPL-SCNC: 140 MMOL/L — SIGNIFICANT CHANGE UP (ref 135–145)
SODIUM SERPL-SCNC: 140 MMOL/L — SIGNIFICANT CHANGE UP (ref 135–145)
SODIUM SERPL-SCNC: 141 MMOL/L — SIGNIFICANT CHANGE UP (ref 135–145)
SODIUM SERPL-SCNC: 141 MMOL/L — SIGNIFICANT CHANGE UP (ref 135–145)
SODIUM SERPL-SCNC: 142 MMOL/L — SIGNIFICANT CHANGE UP (ref 135–145)
SODIUM SERPL-SCNC: 142 MMOL/L — SIGNIFICANT CHANGE UP (ref 135–145)
SODIUM SERPL-SCNC: 143 MMOL/L — SIGNIFICANT CHANGE UP (ref 135–145)
SODIUM SERPL-SCNC: 144 MMOL/L — SIGNIFICANT CHANGE UP (ref 135–145)
SP GR SPEC: 1.02 — SIGNIFICANT CHANGE UP (ref 1–1.04)
SPECIMEN SOURCE: SIGNIFICANT CHANGE UP
SQUAMOUS # UR AUTO: SIGNIFICANT CHANGE UP
TARGETS BLD QL SMEAR: SIGNIFICANT CHANGE UP
TARGETS BLD QL SMEAR: SIGNIFICANT CHANGE UP
UROBILINOGEN FLD QL: HIGH
VARIANT LYMPHS # BLD: 0 % — SIGNIFICANT CHANGE UP
VARIANT LYMPHS # BLD: 0 % — SIGNIFICANT CHANGE UP
WBC # BLD: 12.09 K/UL — HIGH (ref 3.8–10.5)
WBC # BLD: 12.1 K/UL — HIGH (ref 3.8–10.5)
WBC # BLD: 12.69 K/UL — HIGH (ref 3.8–10.5)
WBC # BLD: 12.77 K/UL — HIGH (ref 3.8–10.5)
WBC # BLD: 13.16 K/UL — HIGH (ref 3.8–10.5)
WBC # BLD: 13.42 K/UL — HIGH (ref 3.8–10.5)
WBC # BLD: 15.19 K/UL — HIGH (ref 3.8–10.5)
WBC # BLD: 20.71 K/UL — HIGH (ref 3.8–10.5)
WBC # FLD AUTO: 12.09 K/UL — HIGH (ref 3.8–10.5)
WBC # FLD AUTO: 12.1 K/UL — HIGH (ref 3.8–10.5)
WBC # FLD AUTO: 12.69 K/UL — HIGH (ref 3.8–10.5)
WBC # FLD AUTO: 12.77 K/UL — HIGH (ref 3.8–10.5)
WBC # FLD AUTO: 13.16 K/UL — HIGH (ref 3.8–10.5)
WBC # FLD AUTO: 13.42 K/UL — HIGH (ref 3.8–10.5)
WBC # FLD AUTO: 15.19 K/UL — HIGH (ref 3.8–10.5)
WBC # FLD AUTO: 20.71 K/UL — HIGH (ref 3.8–10.5)
WBC UR QL: HIGH (ref 0–?)

## 2018-01-01 PROCEDURE — 99233 SBSQ HOSP IP/OBS HIGH 50: CPT

## 2018-01-01 PROCEDURE — 99497 ADVNCD CARE PLAN 30 MIN: CPT | Mod: 25

## 2018-01-01 PROCEDURE — 93010 ELECTROCARDIOGRAM REPORT: CPT

## 2018-01-01 PROCEDURE — 70552 MRI BRAIN STEM W/DYE: CPT | Mod: 26

## 2018-01-01 PROCEDURE — 74177 CT ABD & PELVIS W/CONTRAST: CPT | Mod: 26

## 2018-01-01 PROCEDURE — 99223 1ST HOSP IP/OBS HIGH 75: CPT

## 2018-01-01 PROCEDURE — 71045 X-RAY EXAM CHEST 1 VIEW: CPT | Mod: 26

## 2018-01-01 RX ORDER — IBUPROFEN 200 MG
1 TABLET ORAL
Qty: 0 | Refills: 0 | COMMUNITY

## 2018-01-01 RX ORDER — NYSTATIN 500MM UNIT
500000 POWDER (EA) MISCELLANEOUS
Qty: 0 | Refills: 0 | Status: DISCONTINUED | OUTPATIENT
Start: 2018-01-01 | End: 2019-01-01

## 2018-01-01 RX ORDER — GANCICLOVIR SODIUM 50 MG/ML
400 INJECTION, POWDER, LYOPHILIZED, FOR SOLUTION INTRAVENTRICULAR EVERY 12 HOURS
Qty: 0 | Refills: 0 | Status: DISCONTINUED | OUTPATIENT
Start: 2018-01-01 | End: 2018-01-01

## 2018-01-01 RX ORDER — ONDANSETRON 8 MG/1
8 TABLET, FILM COATED ORAL ONCE
Qty: 0 | Refills: 0 | Status: COMPLETED | OUTPATIENT
Start: 2018-01-01 | End: 2018-01-01

## 2018-01-01 RX ORDER — ENOXAPARIN SODIUM 100 MG/ML
40 INJECTION SUBCUTANEOUS EVERY 24 HOURS
Qty: 0 | Refills: 0 | Status: DISCONTINUED | OUTPATIENT
Start: 2018-01-01 | End: 2018-01-01

## 2018-01-01 RX ORDER — IPRATROPIUM/ALBUTEROL SULFATE 18-103MCG
3 AEROSOL WITH ADAPTER (GRAM) INHALATION EVERY 6 HOURS
Qty: 0 | Refills: 0 | Status: DISCONTINUED | OUTPATIENT
Start: 2018-01-01 | End: 2019-01-01

## 2018-01-01 RX ORDER — ONDANSETRON 8 MG/1
4 TABLET, FILM COATED ORAL EVERY 6 HOURS
Qty: 0 | Refills: 0 | Status: DISCONTINUED | OUTPATIENT
Start: 2018-01-01 | End: 2019-01-01

## 2018-01-01 RX ORDER — METOCLOPRAMIDE HCL 10 MG
10 TABLET ORAL EVERY 6 HOURS
Qty: 0 | Refills: 0 | Status: DISCONTINUED | OUTPATIENT
Start: 2018-01-01 | End: 2019-01-01

## 2018-01-01 RX ORDER — MORPHINE SULFATE 50 MG/1
2 CAPSULE, EXTENDED RELEASE ORAL EVERY 4 HOURS
Qty: 0 | Refills: 0 | Status: DISCONTINUED | OUTPATIENT
Start: 2018-01-01 | End: 2018-01-01

## 2018-01-01 RX ORDER — GANCICLOVIR SODIUM 50 MG/ML
400 INJECTION, POWDER, LYOPHILIZED, FOR SOLUTION INTRAVENTRICULAR EVERY 12 HOURS
Qty: 0 | Refills: 0 | Status: DISCONTINUED | OUTPATIENT
Start: 2018-01-01 | End: 2019-01-01

## 2018-01-01 RX ORDER — DEXAMETHASONE 0.5 MG/5ML
1 ELIXIR ORAL
Qty: 0 | Refills: 0 | COMMUNITY

## 2018-01-01 RX ORDER — LACTULOSE 10 G/15ML
10 SOLUTION ORAL
Qty: 0 | Refills: 0 | Status: COMPLETED | OUTPATIENT
Start: 2018-01-01 | End: 2018-01-01

## 2018-01-01 RX ORDER — SODIUM CHLORIDE 9 MG/ML
2000 INJECTION, SOLUTION INTRAVENOUS ONCE
Qty: 0 | Refills: 0 | Status: COMPLETED | OUTPATIENT
Start: 2018-01-01 | End: 2018-01-01

## 2018-01-01 RX ORDER — IPRATROPIUM/ALBUTEROL SULFATE 18-103MCG
3 AEROSOL WITH ADAPTER (GRAM) INHALATION ONCE
Qty: 0 | Refills: 0 | Status: COMPLETED | OUTPATIENT
Start: 2018-01-01 | End: 2018-01-01

## 2018-01-01 RX ORDER — OXYCODONE HYDROCHLORIDE 5 MG/1
2.5 TABLET ORAL ONCE
Qty: 0 | Refills: 0 | Status: DISCONTINUED | OUTPATIENT
Start: 2018-01-01 | End: 2018-01-01

## 2018-01-01 RX ORDER — SODIUM CHLORIDE 9 MG/ML
1000 INJECTION, SOLUTION INTRAVENOUS
Qty: 0 | Refills: 0 | Status: DISCONTINUED | OUTPATIENT
Start: 2018-01-01 | End: 2019-01-01

## 2018-01-01 RX ORDER — MORPHINE SULFATE 50 MG/1
1 CAPSULE, EXTENDED RELEASE ORAL EVERY 4 HOURS
Qty: 0 | Refills: 0 | Status: DISCONTINUED | OUTPATIENT
Start: 2018-01-01 | End: 2019-01-01

## 2018-01-01 RX ORDER — IPRATROPIUM BROMIDE 0.2 MG/ML
500 SOLUTION, NON-ORAL INHALATION EVERY 6 HOURS
Qty: 0 | Refills: 0 | Status: DISCONTINUED | OUTPATIENT
Start: 2018-01-01 | End: 2018-01-01

## 2018-01-01 RX ORDER — SODIUM CHLORIDE 9 MG/ML
1000 INJECTION, SOLUTION INTRAVENOUS ONCE
Qty: 0 | Refills: 0 | Status: COMPLETED | OUTPATIENT
Start: 2018-01-01 | End: 2018-01-01

## 2018-01-01 RX ORDER — SENNA PLUS 8.6 MG/1
2 TABLET ORAL AT BEDTIME
Qty: 0 | Refills: 0 | Status: DISCONTINUED | OUTPATIENT
Start: 2018-01-01 | End: 2019-01-01

## 2018-01-01 RX ORDER — OXYCODONE HYDROCHLORIDE 5 MG/1
5 TABLET ORAL ONCE
Qty: 0 | Refills: 0 | Status: DISCONTINUED | OUTPATIENT
Start: 2018-01-01 | End: 2018-01-01

## 2018-01-01 RX ORDER — OXYCODONE HYDROCHLORIDE 5 MG/1
5 TABLET ORAL EVERY 8 HOURS
Qty: 0 | Refills: 0 | Status: DISCONTINUED | OUTPATIENT
Start: 2018-01-01 | End: 2018-01-01

## 2018-01-01 RX ORDER — OXYCODONE HYDROCHLORIDE 5 MG/1
1 TABLET ORAL
Qty: 0 | Refills: 0 | COMMUNITY

## 2018-01-01 RX ORDER — INFLUENZA VIRUS VACCINE 15; 15; 15; 15 UG/.5ML; UG/.5ML; UG/.5ML; UG/.5ML
0.5 SUSPENSION INTRAMUSCULAR ONCE
Qty: 0 | Refills: 0 | Status: DISCONTINUED | OUTPATIENT
Start: 2018-01-01 | End: 2019-01-01

## 2018-01-01 RX ORDER — OXYCODONE AND ACETAMINOPHEN 5; 325 MG/1; MG/1
1 TABLET ORAL ONCE
Qty: 0 | Refills: 0 | Status: DISCONTINUED | OUTPATIENT
Start: 2018-01-01 | End: 2018-01-01

## 2018-01-01 RX ORDER — ONDANSETRON 8 MG/1
1 TABLET, FILM COATED ORAL
Qty: 0 | Refills: 0 | COMMUNITY

## 2018-01-01 RX ORDER — PANTOPRAZOLE SODIUM 20 MG/1
1 TABLET, DELAYED RELEASE ORAL
Qty: 0 | Refills: 0 | COMMUNITY

## 2018-01-01 RX ORDER — RANITIDINE HYDROCHLORIDE 150 MG/1
1 TABLET, FILM COATED ORAL
Qty: 0 | Refills: 0 | COMMUNITY

## 2018-01-01 RX ORDER — POLYETHYLENE GLYCOL 3350 17 G/17G
17 POWDER, FOR SOLUTION ORAL DAILY
Qty: 0 | Refills: 0 | Status: DISCONTINUED | OUTPATIENT
Start: 2018-01-01 | End: 2019-01-01

## 2018-01-01 RX ORDER — PIPERACILLIN AND TAZOBACTAM 4; .5 G/20ML; G/20ML
3.38 INJECTION, POWDER, LYOPHILIZED, FOR SOLUTION INTRAVENOUS EVERY 8 HOURS
Qty: 0 | Refills: 0 | Status: DISCONTINUED | OUTPATIENT
Start: 2018-01-01 | End: 2019-01-01

## 2018-01-01 RX ORDER — AMLODIPINE BESYLATE 2.5 MG/1
1 TABLET ORAL
Qty: 0 | Refills: 0 | COMMUNITY

## 2018-01-01 RX ORDER — SODIUM CHLORIDE 9 MG/ML
1000 INJECTION, SOLUTION INTRAVENOUS
Qty: 0 | Refills: 0 | Status: DISCONTINUED | OUTPATIENT
Start: 2018-01-01 | End: 2018-01-01

## 2018-01-01 RX ADMIN — OXYCODONE HYDROCHLORIDE 5 MILLIGRAM(S): 5 TABLET ORAL at 14:22

## 2018-01-01 RX ADMIN — Medication 500000 UNIT(S): at 23:05

## 2018-01-01 RX ADMIN — PIPERACILLIN AND TAZOBACTAM 25 GRAM(S): 4; .5 INJECTION, POWDER, LYOPHILIZED, FOR SOLUTION INTRAVENOUS at 14:37

## 2018-01-01 RX ADMIN — SENNA PLUS 2 TABLET(S): 8.6 TABLET ORAL at 22:21

## 2018-01-01 RX ADMIN — PIPERACILLIN AND TAZOBACTAM 25 GRAM(S): 4; .5 INJECTION, POWDER, LYOPHILIZED, FOR SOLUTION INTRAVENOUS at 13:15

## 2018-01-01 RX ADMIN — PIPERACILLIN AND TAZOBACTAM 25 GRAM(S): 4; .5 INJECTION, POWDER, LYOPHILIZED, FOR SOLUTION INTRAVENOUS at 05:33

## 2018-01-01 RX ADMIN — Medication 500000 UNIT(S): at 17:35

## 2018-01-01 RX ADMIN — PIPERACILLIN AND TAZOBACTAM 25 GRAM(S): 4; .5 INJECTION, POWDER, LYOPHILIZED, FOR SOLUTION INTRAVENOUS at 22:22

## 2018-01-01 RX ADMIN — PIPERACILLIN AND TAZOBACTAM 25 GRAM(S): 4; .5 INJECTION, POWDER, LYOPHILIZED, FOR SOLUTION INTRAVENOUS at 22:21

## 2018-01-01 RX ADMIN — Medication 500000 UNIT(S): at 06:07

## 2018-01-01 RX ADMIN — PIPERACILLIN AND TAZOBACTAM 25 GRAM(S): 4; .5 INJECTION, POWDER, LYOPHILIZED, FOR SOLUTION INTRAVENOUS at 21:39

## 2018-01-01 RX ADMIN — OXYCODONE HYDROCHLORIDE 5 MILLIGRAM(S): 5 TABLET ORAL at 06:24

## 2018-01-01 RX ADMIN — PIPERACILLIN AND TAZOBACTAM 25 GRAM(S): 4; .5 INJECTION, POWDER, LYOPHILIZED, FOR SOLUTION INTRAVENOUS at 07:41

## 2018-01-01 RX ADMIN — OXYCODONE HYDROCHLORIDE 5 MILLIGRAM(S): 5 TABLET ORAL at 17:13

## 2018-01-01 RX ADMIN — SODIUM CHLORIDE 75 MILLILITER(S): 9 INJECTION, SOLUTION INTRAVENOUS at 06:30

## 2018-01-01 RX ADMIN — SENNA PLUS 2 TABLET(S): 8.6 TABLET ORAL at 21:29

## 2018-01-01 RX ADMIN — GANCICLOVIR SODIUM 100 MILLIGRAM(S): 50 INJECTION, POWDER, LYOPHILIZED, FOR SOLUTION INTRAVENTRICULAR at 05:13

## 2018-01-01 RX ADMIN — MORPHINE SULFATE 1 MILLIGRAM(S): 50 CAPSULE, EXTENDED RELEASE ORAL at 13:22

## 2018-01-01 RX ADMIN — Medication 500000 UNIT(S): at 12:37

## 2018-01-01 RX ADMIN — SODIUM CHLORIDE 75 MILLILITER(S): 9 INJECTION, SOLUTION INTRAVENOUS at 11:08

## 2018-01-01 RX ADMIN — MORPHINE SULFATE 1 MILLIGRAM(S): 50 CAPSULE, EXTENDED RELEASE ORAL at 04:41

## 2018-01-01 RX ADMIN — Medication 500000 UNIT(S): at 18:48

## 2018-01-01 RX ADMIN — Medication 500000 UNIT(S): at 11:04

## 2018-01-01 RX ADMIN — Medication 500000 UNIT(S): at 00:04

## 2018-01-01 RX ADMIN — GANCICLOVIR SODIUM 100 MILLIGRAM(S): 50 INJECTION, POWDER, LYOPHILIZED, FOR SOLUTION INTRAVENTRICULAR at 18:13

## 2018-01-01 RX ADMIN — PIPERACILLIN AND TAZOBACTAM 25 GRAM(S): 4; .5 INJECTION, POWDER, LYOPHILIZED, FOR SOLUTION INTRAVENOUS at 21:42

## 2018-01-01 RX ADMIN — PIPERACILLIN AND TAZOBACTAM 25 GRAM(S): 4; .5 INJECTION, POWDER, LYOPHILIZED, FOR SOLUTION INTRAVENOUS at 20:52

## 2018-01-01 RX ADMIN — GANCICLOVIR SODIUM 100 MILLIGRAM(S): 50 INJECTION, POWDER, LYOPHILIZED, FOR SOLUTION INTRAVENTRICULAR at 17:24

## 2018-01-01 RX ADMIN — OXYCODONE HYDROCHLORIDE 5 MILLIGRAM(S): 5 TABLET ORAL at 14:20

## 2018-01-01 RX ADMIN — MORPHINE SULFATE 1 MILLIGRAM(S): 50 CAPSULE, EXTENDED RELEASE ORAL at 15:52

## 2018-01-01 RX ADMIN — MORPHINE SULFATE 1 MILLIGRAM(S): 50 CAPSULE, EXTENDED RELEASE ORAL at 16:15

## 2018-01-01 RX ADMIN — PIPERACILLIN AND TAZOBACTAM 25 GRAM(S): 4; .5 INJECTION, POWDER, LYOPHILIZED, FOR SOLUTION INTRAVENOUS at 05:18

## 2018-01-01 RX ADMIN — ENOXAPARIN SODIUM 40 MILLIGRAM(S): 100 INJECTION SUBCUTANEOUS at 05:54

## 2018-01-01 RX ADMIN — MORPHINE SULFATE 2 MILLIGRAM(S): 50 CAPSULE, EXTENDED RELEASE ORAL at 11:38

## 2018-01-01 RX ADMIN — OXYCODONE HYDROCHLORIDE 5 MILLIGRAM(S): 5 TABLET ORAL at 13:20

## 2018-01-01 RX ADMIN — OXYCODONE HYDROCHLORIDE 5 MILLIGRAM(S): 5 TABLET ORAL at 22:22

## 2018-01-01 RX ADMIN — Medication 500000 UNIT(S): at 23:17

## 2018-01-01 RX ADMIN — SENNA PLUS 2 TABLET(S): 8.6 TABLET ORAL at 21:54

## 2018-01-01 RX ADMIN — Medication 500000 UNIT(S): at 05:12

## 2018-01-01 RX ADMIN — OXYCODONE HYDROCHLORIDE 5 MILLIGRAM(S): 5 TABLET ORAL at 22:12

## 2018-01-01 RX ADMIN — POLYETHYLENE GLYCOL 3350 17 GRAM(S): 17 POWDER, FOR SOLUTION ORAL at 11:08

## 2018-01-01 RX ADMIN — Medication 500000 UNIT(S): at 16:45

## 2018-01-01 RX ADMIN — PIPERACILLIN AND TAZOBACTAM 25 GRAM(S): 4; .5 INJECTION, POWDER, LYOPHILIZED, FOR SOLUTION INTRAVENOUS at 12:30

## 2018-01-01 RX ADMIN — Medication 500000 UNIT(S): at 12:30

## 2018-01-01 RX ADMIN — Medication 500000 UNIT(S): at 23:12

## 2018-01-01 RX ADMIN — MORPHINE SULFATE 1 MILLIGRAM(S): 50 CAPSULE, EXTENDED RELEASE ORAL at 13:48

## 2018-01-01 RX ADMIN — PIPERACILLIN AND TAZOBACTAM 25 GRAM(S): 4; .5 INJECTION, POWDER, LYOPHILIZED, FOR SOLUTION INTRAVENOUS at 06:30

## 2018-01-01 RX ADMIN — ENOXAPARIN SODIUM 40 MILLIGRAM(S): 100 INJECTION SUBCUTANEOUS at 06:13

## 2018-01-01 RX ADMIN — MORPHINE SULFATE 1 MILLIGRAM(S): 50 CAPSULE, EXTENDED RELEASE ORAL at 04:11

## 2018-01-01 RX ADMIN — Medication 500000 UNIT(S): at 05:06

## 2018-01-01 RX ADMIN — Medication 500000 UNIT(S): at 00:26

## 2018-01-01 RX ADMIN — SODIUM CHLORIDE 50 MILLILITER(S): 9 INJECTION, SOLUTION INTRAVENOUS at 23:34

## 2018-01-01 RX ADMIN — OXYCODONE HYDROCHLORIDE 5 MILLIGRAM(S): 5 TABLET ORAL at 05:28

## 2018-01-01 RX ADMIN — MORPHINE SULFATE 1 MILLIGRAM(S): 50 CAPSULE, EXTENDED RELEASE ORAL at 11:00

## 2018-01-01 RX ADMIN — SODIUM CHLORIDE 75 MILLILITER(S): 9 INJECTION, SOLUTION INTRAVENOUS at 17:51

## 2018-01-01 RX ADMIN — Medication 500000 UNIT(S): at 00:06

## 2018-01-01 RX ADMIN — Medication 500000 UNIT(S): at 06:25

## 2018-01-01 RX ADMIN — OXYCODONE HYDROCHLORIDE 5 MILLIGRAM(S): 5 TABLET ORAL at 16:43

## 2018-01-01 RX ADMIN — Medication 500000 UNIT(S): at 05:23

## 2018-01-01 RX ADMIN — MORPHINE SULFATE 2 MILLIGRAM(S): 50 CAPSULE, EXTENDED RELEASE ORAL at 20:14

## 2018-01-01 RX ADMIN — ENOXAPARIN SODIUM 40 MILLIGRAM(S): 100 INJECTION SUBCUTANEOUS at 05:12

## 2018-01-01 RX ADMIN — Medication 500000 UNIT(S): at 17:51

## 2018-01-01 RX ADMIN — Medication 500000 UNIT(S): at 17:36

## 2018-01-01 RX ADMIN — GANCICLOVIR SODIUM 100 MILLIGRAM(S): 50 INJECTION, POWDER, LYOPHILIZED, FOR SOLUTION INTRAVENTRICULAR at 06:03

## 2018-01-01 RX ADMIN — PIPERACILLIN AND TAZOBACTAM 25 GRAM(S): 4; .5 INJECTION, POWDER, LYOPHILIZED, FOR SOLUTION INTRAVENOUS at 21:59

## 2018-01-01 RX ADMIN — ENOXAPARIN SODIUM 40 MILLIGRAM(S): 100 INJECTION SUBCUTANEOUS at 06:37

## 2018-01-01 RX ADMIN — POLYETHYLENE GLYCOL 3350 17 GRAM(S): 17 POWDER, FOR SOLUTION ORAL at 13:24

## 2018-01-01 RX ADMIN — OXYCODONE HYDROCHLORIDE 5 MILLIGRAM(S): 5 TABLET ORAL at 23:15

## 2018-01-01 RX ADMIN — SODIUM CHLORIDE 50 MILLILITER(S): 9 INJECTION, SOLUTION INTRAVENOUS at 17:36

## 2018-01-01 RX ADMIN — GANCICLOVIR SODIUM 100 MILLIGRAM(S): 50 INJECTION, POWDER, LYOPHILIZED, FOR SOLUTION INTRAVENTRICULAR at 16:43

## 2018-01-01 RX ADMIN — SODIUM CHLORIDE 50 MILLILITER(S): 9 INJECTION, SOLUTION INTRAVENOUS at 05:23

## 2018-01-01 RX ADMIN — Medication 500000 UNIT(S): at 06:13

## 2018-01-01 RX ADMIN — PIPERACILLIN AND TAZOBACTAM 25 GRAM(S): 4; .5 INJECTION, POWDER, LYOPHILIZED, FOR SOLUTION INTRAVENOUS at 21:29

## 2018-01-01 RX ADMIN — GANCICLOVIR SODIUM 100 MILLIGRAM(S): 50 INJECTION, POWDER, LYOPHILIZED, FOR SOLUTION INTRAVENTRICULAR at 06:09

## 2018-01-01 RX ADMIN — SENNA PLUS 2 TABLET(S): 8.6 TABLET ORAL at 21:59

## 2018-01-01 RX ADMIN — GANCICLOVIR SODIUM 100 MILLIGRAM(S): 50 INJECTION, POWDER, LYOPHILIZED, FOR SOLUTION INTRAVENTRICULAR at 17:51

## 2018-01-01 RX ADMIN — SODIUM CHLORIDE 50 MILLILITER(S): 9 INJECTION, SOLUTION INTRAVENOUS at 20:26

## 2018-01-01 RX ADMIN — SODIUM CHLORIDE 2000 MILLILITER(S): 9 INJECTION, SOLUTION INTRAVENOUS at 19:08

## 2018-01-01 RX ADMIN — OXYCODONE HYDROCHLORIDE 5 MILLIGRAM(S): 5 TABLET ORAL at 12:37

## 2018-01-01 RX ADMIN — POLYETHYLENE GLYCOL 3350 17 GRAM(S): 17 POWDER, FOR SOLUTION ORAL at 11:05

## 2018-01-01 RX ADMIN — SODIUM CHLORIDE 75 MILLILITER(S): 9 INJECTION, SOLUTION INTRAVENOUS at 23:06

## 2018-01-01 RX ADMIN — SODIUM CHLORIDE 75 MILLILITER(S): 9 INJECTION, SOLUTION INTRAVENOUS at 00:02

## 2018-01-01 RX ADMIN — PIPERACILLIN AND TAZOBACTAM 25 GRAM(S): 4; .5 INJECTION, POWDER, LYOPHILIZED, FOR SOLUTION INTRAVENOUS at 21:54

## 2018-01-01 RX ADMIN — MORPHINE SULFATE 1 MILLIGRAM(S): 50 CAPSULE, EXTENDED RELEASE ORAL at 14:05

## 2018-01-01 RX ADMIN — Medication 500000 UNIT(S): at 19:27

## 2018-01-01 RX ADMIN — PIPERACILLIN AND TAZOBACTAM 25 GRAM(S): 4; .5 INJECTION, POWDER, LYOPHILIZED, FOR SOLUTION INTRAVENOUS at 14:49

## 2018-01-01 RX ADMIN — OXYCODONE HYDROCHLORIDE 5 MILLIGRAM(S): 5 TABLET ORAL at 03:22

## 2018-01-01 RX ADMIN — OXYCODONE HYDROCHLORIDE 5 MILLIGRAM(S): 5 TABLET ORAL at 14:50

## 2018-01-01 RX ADMIN — OXYCODONE HYDROCHLORIDE 5 MILLIGRAM(S): 5 TABLET ORAL at 13:00

## 2018-01-01 RX ADMIN — Medication 500000 UNIT(S): at 00:02

## 2018-01-01 RX ADMIN — GANCICLOVIR SODIUM 100 MILLIGRAM(S): 50 INJECTION, POWDER, LYOPHILIZED, FOR SOLUTION INTRAVENTRICULAR at 05:01

## 2018-01-01 RX ADMIN — Medication 500000 UNIT(S): at 05:54

## 2018-01-01 RX ADMIN — Medication 500000 UNIT(S): at 13:06

## 2018-01-01 RX ADMIN — Medication 500000 UNIT(S): at 17:50

## 2018-01-01 RX ADMIN — MORPHINE SULFATE 2 MILLIGRAM(S): 50 CAPSULE, EXTENDED RELEASE ORAL at 20:29

## 2018-01-01 RX ADMIN — Medication 500000 UNIT(S): at 17:32

## 2018-01-01 RX ADMIN — PIPERACILLIN AND TAZOBACTAM 25 GRAM(S): 4; .5 INJECTION, POWDER, LYOPHILIZED, FOR SOLUTION INTRAVENOUS at 16:10

## 2018-01-01 RX ADMIN — Medication 500000 UNIT(S): at 13:24

## 2018-01-01 RX ADMIN — Medication 500000 UNIT(S): at 18:00

## 2018-01-01 RX ADMIN — MORPHINE SULFATE 1 MILLIGRAM(S): 50 CAPSULE, EXTENDED RELEASE ORAL at 03:26

## 2018-01-01 RX ADMIN — Medication 63.75 MILLIMOLE(S): at 16:10

## 2018-01-01 RX ADMIN — Medication 500000 UNIT(S): at 18:12

## 2018-01-01 RX ADMIN — PIPERACILLIN AND TAZOBACTAM 25 GRAM(S): 4; .5 INJECTION, POWDER, LYOPHILIZED, FOR SOLUTION INTRAVENOUS at 14:40

## 2018-01-01 RX ADMIN — ENOXAPARIN SODIUM 40 MILLIGRAM(S): 100 INJECTION SUBCUTANEOUS at 06:03

## 2018-01-01 RX ADMIN — Medication 500000 UNIT(S): at 06:03

## 2018-01-01 RX ADMIN — Medication 3 MILLILITER(S): at 03:20

## 2018-01-01 RX ADMIN — Medication 500000 UNIT(S): at 12:50

## 2018-01-01 RX ADMIN — SODIUM CHLORIDE 75 MILLILITER(S): 9 INJECTION, SOLUTION INTRAVENOUS at 22:23

## 2018-01-01 RX ADMIN — Medication 500000 UNIT(S): at 05:18

## 2018-01-01 RX ADMIN — POLYETHYLENE GLYCOL 3350 17 GRAM(S): 17 POWDER, FOR SOLUTION ORAL at 12:38

## 2018-01-01 RX ADMIN — MORPHINE SULFATE 1 MILLIGRAM(S): 50 CAPSULE, EXTENDED RELEASE ORAL at 19:45

## 2018-01-01 RX ADMIN — PIPERACILLIN AND TAZOBACTAM 25 GRAM(S): 4; .5 INJECTION, POWDER, LYOPHILIZED, FOR SOLUTION INTRAVENOUS at 07:23

## 2018-01-01 RX ADMIN — OXYCODONE HYDROCHLORIDE 5 MILLIGRAM(S): 5 TABLET ORAL at 21:42

## 2018-01-01 RX ADMIN — LACTULOSE 10 GRAM(S): 10 SOLUTION ORAL at 06:12

## 2018-01-01 RX ADMIN — Medication 3 MILLILITER(S): at 15:50

## 2018-01-01 RX ADMIN — Medication 500000 UNIT(S): at 12:31

## 2018-01-01 RX ADMIN — GANCICLOVIR SODIUM 100 MILLIGRAM(S): 50 INJECTION, POWDER, LYOPHILIZED, FOR SOLUTION INTRAVENTRICULAR at 06:07

## 2018-01-01 RX ADMIN — SODIUM CHLORIDE 75 MILLILITER(S): 9 INJECTION, SOLUTION INTRAVENOUS at 04:11

## 2018-01-01 RX ADMIN — Medication 3 MILLILITER(S): at 22:41

## 2018-01-01 RX ADMIN — Medication 500000 UNIT(S): at 23:18

## 2018-01-01 RX ADMIN — PIPERACILLIN AND TAZOBACTAM 25 GRAM(S): 4; .5 INJECTION, POWDER, LYOPHILIZED, FOR SOLUTION INTRAVENOUS at 21:19

## 2018-01-01 RX ADMIN — Medication 500000 UNIT(S): at 23:26

## 2018-01-01 RX ADMIN — ENOXAPARIN SODIUM 40 MILLIGRAM(S): 100 INJECTION SUBCUTANEOUS at 05:23

## 2018-01-01 RX ADMIN — MORPHINE SULFATE 1 MILLIGRAM(S): 50 CAPSULE, EXTENDED RELEASE ORAL at 13:40

## 2018-01-01 RX ADMIN — ENOXAPARIN SODIUM 40 MILLIGRAM(S): 100 INJECTION SUBCUTANEOUS at 05:32

## 2018-01-01 RX ADMIN — POLYETHYLENE GLYCOL 3350 17 GRAM(S): 17 POWDER, FOR SOLUTION ORAL at 12:50

## 2018-01-01 RX ADMIN — GANCICLOVIR SODIUM 100 MILLIGRAM(S): 50 INJECTION, POWDER, LYOPHILIZED, FOR SOLUTION INTRAVENTRICULAR at 19:32

## 2018-01-01 RX ADMIN — PIPERACILLIN AND TAZOBACTAM 25 GRAM(S): 4; .5 INJECTION, POWDER, LYOPHILIZED, FOR SOLUTION INTRAVENOUS at 06:13

## 2018-01-01 RX ADMIN — MORPHINE SULFATE 1 MILLIGRAM(S): 50 CAPSULE, EXTENDED RELEASE ORAL at 11:30

## 2018-01-01 RX ADMIN — MORPHINE SULFATE 1 MILLIGRAM(S): 50 CAPSULE, EXTENDED RELEASE ORAL at 18:48

## 2018-01-01 RX ADMIN — Medication 500000 UNIT(S): at 13:14

## 2018-01-01 RX ADMIN — PIPERACILLIN AND TAZOBACTAM 25 GRAM(S): 4; .5 INJECTION, POWDER, LYOPHILIZED, FOR SOLUTION INTRAVENOUS at 05:54

## 2018-01-01 RX ADMIN — SODIUM CHLORIDE 75 MILLILITER(S): 9 INJECTION, SOLUTION INTRAVENOUS at 06:13

## 2018-01-01 RX ADMIN — Medication 500000 UNIT(S): at 17:24

## 2018-01-01 RX ADMIN — MORPHINE SULFATE 2 MILLIGRAM(S): 50 CAPSULE, EXTENDED RELEASE ORAL at 11:08

## 2018-01-01 RX ADMIN — ONDANSETRON 8 MILLIGRAM(S): 8 TABLET, FILM COATED ORAL at 19:08

## 2018-01-01 RX ADMIN — Medication 500000 UNIT(S): at 11:08

## 2018-01-01 RX ADMIN — Medication 3 MILLILITER(S): at 21:07

## 2018-01-01 RX ADMIN — PIPERACILLIN AND TAZOBACTAM 25 GRAM(S): 4; .5 INJECTION, POWDER, LYOPHILIZED, FOR SOLUTION INTRAVENOUS at 13:25

## 2018-01-01 RX ADMIN — PIPERACILLIN AND TAZOBACTAM 25 GRAM(S): 4; .5 INJECTION, POWDER, LYOPHILIZED, FOR SOLUTION INTRAVENOUS at 13:43

## 2018-01-01 RX ADMIN — SENNA PLUS 2 TABLET(S): 8.6 TABLET ORAL at 20:51

## 2018-01-01 RX ADMIN — PIPERACILLIN AND TAZOBACTAM 25 GRAM(S): 4; .5 INJECTION, POWDER, LYOPHILIZED, FOR SOLUTION INTRAVENOUS at 05:23

## 2018-01-01 RX ADMIN — OXYCODONE HYDROCHLORIDE 5 MILLIGRAM(S): 5 TABLET ORAL at 02:22

## 2018-01-01 RX ADMIN — PIPERACILLIN AND TAZOBACTAM 25 GRAM(S): 4; .5 INJECTION, POWDER, LYOPHILIZED, FOR SOLUTION INTRAVENOUS at 06:25

## 2018-01-01 RX ADMIN — OXYCODONE HYDROCHLORIDE 5 MILLIGRAM(S): 5 TABLET ORAL at 20:39

## 2018-01-01 RX ADMIN — MORPHINE SULFATE 1 MILLIGRAM(S): 50 CAPSULE, EXTENDED RELEASE ORAL at 20:16

## 2018-01-01 RX ADMIN — POLYETHYLENE GLYCOL 3350 17 GRAM(S): 17 POWDER, FOR SOLUTION ORAL at 12:37

## 2018-01-01 RX ADMIN — LACTULOSE 10 GRAM(S): 10 SOLUTION ORAL at 18:47

## 2018-01-01 RX ADMIN — SODIUM CHLORIDE 50 MILLILITER(S): 9 INJECTION, SOLUTION INTRAVENOUS at 05:54

## 2018-01-01 RX ADMIN — MORPHINE SULFATE 1 MILLIGRAM(S): 50 CAPSULE, EXTENDED RELEASE ORAL at 03:56

## 2018-01-01 RX ADMIN — Medication 500000 UNIT(S): at 05:32

## 2018-01-01 RX ADMIN — PIPERACILLIN AND TAZOBACTAM 25 GRAM(S): 4; .5 INJECTION, POWDER, LYOPHILIZED, FOR SOLUTION INTRAVENOUS at 13:21

## 2018-01-01 RX ADMIN — GANCICLOVIR SODIUM 100 MILLIGRAM(S): 50 INJECTION, POWDER, LYOPHILIZED, FOR SOLUTION INTRAVENTRICULAR at 06:25

## 2018-01-01 RX ADMIN — OXYCODONE HYDROCHLORIDE 5 MILLIGRAM(S): 5 TABLET ORAL at 12:30

## 2018-01-01 RX ADMIN — Medication 500000 UNIT(S): at 06:37

## 2018-01-01 RX ADMIN — LACTULOSE 10 GRAM(S): 10 SOLUTION ORAL at 06:03

## 2018-01-01 RX ADMIN — POLYETHYLENE GLYCOL 3350 17 GRAM(S): 17 POWDER, FOR SOLUTION ORAL at 13:14

## 2018-01-01 RX ADMIN — Medication 500000 UNIT(S): at 13:22

## 2018-01-01 RX ADMIN — ENOXAPARIN SODIUM 40 MILLIGRAM(S): 100 INJECTION SUBCUTANEOUS at 06:07

## 2018-01-01 RX ADMIN — MORPHINE SULFATE 1 MILLIGRAM(S): 50 CAPSULE, EXTENDED RELEASE ORAL at 19:18

## 2018-01-01 RX ADMIN — Medication 3 MILLILITER(S): at 10:06

## 2018-01-01 RX ADMIN — SODIUM CHLORIDE 1000 MILLILITER(S): 9 INJECTION, SOLUTION INTRAVENOUS at 20:18

## 2018-01-01 RX ADMIN — SENNA PLUS 2 TABLET(S): 8.6 TABLET ORAL at 22:22

## 2018-01-01 RX ADMIN — GANCICLOVIR SODIUM 100 MILLIGRAM(S): 50 INJECTION, POWDER, LYOPHILIZED, FOR SOLUTION INTRAVENTRICULAR at 19:31

## 2018-01-01 RX ADMIN — Medication 500000 UNIT(S): at 23:34

## 2018-01-01 RX ADMIN — SENNA PLUS 2 TABLET(S): 8.6 TABLET ORAL at 21:42

## 2018-01-01 RX ADMIN — LACTULOSE 10 GRAM(S): 10 SOLUTION ORAL at 17:50

## 2018-01-01 RX ADMIN — PIPERACILLIN AND TAZOBACTAM 25 GRAM(S): 4; .5 INJECTION, POWDER, LYOPHILIZED, FOR SOLUTION INTRAVENOUS at 13:05

## 2018-01-01 RX ADMIN — PIPERACILLIN AND TAZOBACTAM 25 GRAM(S): 4; .5 INJECTION, POWDER, LYOPHILIZED, FOR SOLUTION INTRAVENOUS at 21:48

## 2018-01-01 RX ADMIN — SODIUM CHLORIDE 75 MILLILITER(S): 9 INJECTION, SOLUTION INTRAVENOUS at 19:30

## 2018-01-01 RX ADMIN — GANCICLOVIR SODIUM 100 MILLIGRAM(S): 50 INJECTION, POWDER, LYOPHILIZED, FOR SOLUTION INTRAVENTRICULAR at 17:32

## 2018-01-01 RX ADMIN — PIPERACILLIN AND TAZOBACTAM 25 GRAM(S): 4; .5 INJECTION, POWDER, LYOPHILIZED, FOR SOLUTION INTRAVENOUS at 07:20

## 2018-01-01 RX ADMIN — OXYCODONE HYDROCHLORIDE 5 MILLIGRAM(S): 5 TABLET ORAL at 15:20

## 2018-01-01 RX ADMIN — ENOXAPARIN SODIUM 40 MILLIGRAM(S): 100 INJECTION SUBCUTANEOUS at 05:19

## 2018-01-01 RX ADMIN — PIPERACILLIN AND TAZOBACTAM 25 GRAM(S): 4; .5 INJECTION, POWDER, LYOPHILIZED, FOR SOLUTION INTRAVENOUS at 22:24

## 2018-01-01 RX ADMIN — Medication 3 MILLILITER(S): at 18:41

## 2018-01-01 RX ADMIN — SENNA PLUS 2 TABLET(S): 8.6 TABLET ORAL at 21:19

## 2018-12-20 NOTE — ED ADULT NURSE NOTE - OBJECTIVE STATEMENT
c/o nausea/vomiting and abd pain received to room 14. c/o nausea/vomiting for past few days. had episode of abd pain that has since resolved. no diarrhea or constipation. had radiation today for stage 4 cancer, "all over". has not had any chemotherapy yet. labs sent/iv access established. medicated as ordered. will monitor. awaits results in nad with family at bedside.

## 2018-12-20 NOTE — ED PROVIDER NOTE - CARE PLAN
Principal Discharge DX:	Metastatic breast cancer Principal Discharge DX:	Metastatic breast cancer  Secondary Diagnosis:	Dehydration  Secondary Diagnosis:	Elevated liver enzymes

## 2018-12-20 NOTE — ED PROVIDER NOTE - MEDICAL DECISION MAKING DETAILS
63F w/ above history p/w worsening fatigue, N/V, completed radiotherapy today, concerning for symptomatic metastases  -labs, pain control, IVF, admit 63F w/ above history p/w worsening fatigue, N/V, and abd pain. RUQ tender and soft. Completed radiotherapy today, concerning for symptomatic metastases, dehydration.  Weakness 2/2 course of RTx to brain, per family. Here to xfer care to Dr Eng.  Will send labs, provide IVF, reassess. Likely admit.

## 2018-12-20 NOTE — ED PROVIDER NOTE - OBJECTIVE STATEMENT
63F h/o metastatic breast ca (liver, spine) presenting with fatigue. Pt notes worsening fatigue for past month, associated w/ nausea and vomiting for past few days. Completed radiotherapy today. Has chronic epigastric, RUQ pain likely 2/2 to mets. Pt requesting to transfer oncological care to Dr Eng. Denies F/C, cough, CP/SOB. 63F h/o metastatic breast ca (liver, spine, brain, lung) presenting with fatigue and weakness. Pt notes worsening fatigue for past month, associated w/ nausea and vomiting for past few days. Very poor po intake for last few days as well. Completed radiotherapy today, to brain. Has chronic epigastric, RUQ pain likely 2/2 to mets. Pt requesting to transfer oncological care to Dr Eng and, per stanislav at bedside, they are in [facility] ED at instruction of Dr. Eng (pt's son shows email) as they would like to xfer out of [facility]. Denies F/C, cough, CP/SOB, N/V/D.  Per family they would have come sooner but they were recommended to complete course of Rtx prior to xferring care.  There has been no acute change in pt's condition aside from gradual progression of weakness during RTx.  Pt is alert and oriented, tired and weak, and in NAD.  Is c/o chronic abd pain which is attributed to known liver mets.     Per pt's family they have electronic file with all records and imaging which they say they have sent to Dr Eng.

## 2018-12-20 NOTE — ED ADULT NURSE NOTE - EXPLANATION OF PATIENT'S REASON FOR LEAVING
pt states meant to go to [facility], was advised against leaving however left anyway

## 2018-12-20 NOTE — ED ADULT NURSE NOTE - NSIMPLEMENTINTERV_GEN_ALL_ED
Implemented All Fall Risk Interventions:  [place] to call system. Call bell, personal items and telephone within reach. Instruct patient to call for assistance. Room bathroom lighting operational. Non-slip footwear when patient is off stretcher. Physically safe environment: no spills, clutter or unnecessary equipment. Stretcher in lowest position, wheels locked, appropriate side rails in place. Provide visual cue, wrist band, yellow gown, etc. Monitor gait and stability. Monitor for mental status changes and reorient to person, place, and time. Review medications for side effects contributing to fall risk. Reinforce activity limits and safety measures with patient and family.

## 2018-12-20 NOTE — ED PROVIDER NOTE - CONSTITUTIONAL, MLM
normal... appears tired, well nourished, awake, alert, oriented to person, place, time/situation - - -

## 2018-12-20 NOTE — ED PROVIDER NOTE - PROGRESS NOTE DETAILS
pt feeling better after supportive treatment. labs show elevated WBC, lactate, LFTs. lactate improved following fluids. XR shows metastases in lungs. D/w Dr Howe, will admit pt feeling somewhat better after supportive treatment. labs show elevated WBC, lactate, LFTs. XR shows metastases in lungs. D/w Dr oHwe (at Dr Egn's request - d/w him as well), will admit.  CTAP has been ordered and results will be followed by IM team. Elevated lactate without bandemia or other secondary S&S of infection.  Possible related to hepatic dysfunction d/t mets? Not on meds known to cause lactic acidemia.  No e/o hypoperfusion otherwise.  Trending down slightly c IVF.

## 2018-12-20 NOTE — ED PROVIDER NOTE - ATTENDING CONTRIBUTION TO CARE
Attending Attestation: Dr. Galvez  I have personally performed a history and physical examination of the patient and discussed management with the resident as well as the patient.  I reviewed the resident's note and agree with the documented findings and plan of care.  I have authored and modified critical sections of the Provider Note, including but not limited to HPI, Physical Exam and MDM. 63F w/ above history p/w worsening fatigue, N/V, and abd pain. RUQ tender and soft. Completed radiotherapy today, concerning for symptomatic metastases, dehydration.  Weakness 2/2 course of RTx to brain, per family. Here to xfer care to Dr Eng.  Will send labs, provide IVF, reassess. Likely admit.

## 2018-12-21 NOTE — H&P ADULT - NSHPSOCIALHISTORY_GEN_ALL_CORE
Lives with children  Reports no h/o alcohol, tobacco or IV drug use  Used to work as house skipper

## 2018-12-21 NOTE — PHYSICAL THERAPY INITIAL EVALUATION ADULT - ADDITIONAL COMMENTS
Patient lives with family and has been needing assistance due to weakness for the last week. No Assistive Device at home

## 2018-12-21 NOTE — PROGRESS NOTE ADULT - PROBLEM SELECTOR PLAN 3
Likely due to liver mets; No prior results to compare;  -f/u CT A/P w/ contrast  -Monitor liver function

## 2018-12-21 NOTE — CONSULT NOTE ADULT - ASSESSMENT
63F with currently MBC (details pending), s/p brain RT at [facility] recently, here with nausea, vomiting and dehydration along with other sequelae, feeling a little better. Will recommend:  - IVF with monitoring of fluid status regularly  - ondansetron 4 mg q6h and reglan 10 mg q6h as needed  - mouth care  - on DVT prophylaxis  - obtain recent radiology results or CDs from [facility]  - obtain pathology, prior Rx history from the other hospital - pt has some and will ask her family members to bring those here  - PT  - all other supportive Rx

## 2018-12-21 NOTE — PATIENT PROFILE ADULT - IS THERE A SUSPICION OF ABUSE/NEGLIGENCE?
Continued Stay Note  [facility]     Patient Name: Luca Amezcua  MRN: [id number]  Today's Date: 3/20/2017    Admit Date: 3/9/2017          Discharge Plan       03/20/17 1431    Case Management/Social Work Plan    Plan [facility]- [facility] to follow up in am on bed availability    Patient/Family In Agreement With Plan yes    Additional Comments Spoke with Lizet/[facility] and she does anticipate [facility] will accept.  I advised her that dc is anticipated in next day or so per ALYSSA Ortiz.  She does anticipate [facility] will have a bed, but [facility] need to follow up with her in the am.  I attempted to  discuss with patient, but she is currently in dialysis.  I did discuss with her brother, Bahman Amezcua, via telephone and he is agreeable.  States that he will be out of town tomorrow, but it is ok to transfer her  as long as we leave him a message.  Transfer packet in [facility] office.  Will need report/fax numbers  for [facility] once bed obtained.               Discharge Codes     None            Karen San RN     no

## 2018-12-21 NOTE — CONSULT NOTE ADULT - SUBJECTIVE AND OBJECTIVE BOX
Patient is a 63y old  Female who presents with a chief complaint of Weakness (21 Dec 2018 04:17), has received RT to the brain recently, vomiting for a few days and pain lower abd. Denies any headache, visual problems, no specific weakness of any part of the body, no fevers or chills. Has been losing weight for last few weeks. Rest of the detailed ROS unremarkable.       PAST MEDICAL & SURGICAL HISTORY:  Hypertension  Metastatic breast cancer - original diagnosis 3 years ago, received chemo for 6 months and Rt, other details not known, was never on a pill.  History of right mastectomy      Meds:  enoxaparin Injectable 40 milliGRAM(s) SubCutaneous every 24 hours  influenza   Vaccine 0.5 milliLiter(s) IntraMuscular once  nystatin    Suspension 413430 Unit(s) Oral four times a day  ondansetron Injectable 4 milliGRAM(s) IV Push every 6 hours PRN  polyethylene glycol 3350 17 Gram(s) Oral daily  senna 2 Tablet(s) Oral at bedtime      No Known Allergies    SHx: Never smoked and never used alcohol      FAMILY HISTORY:  Family history of hypertension (Mother)      Vital Signs Last 24 Hrs  T(C): 36.3 (21 Dec 2018 06:35), Max: 37.6 (20 Dec 2018 17:30)  T(F): 97.4 (21 Dec 2018 06:35), Max: 99.6 (20 Dec 2018 17:30)  HR: 85 (21 Dec 2018 06:35) (82 - 101)  BP: 114/80 (21 Dec 2018 06:35) (101/80 - 133/90)  BP(mean): --  RR: 17 (21 Dec 2018 06:35) (16 - 18)  SpO2: 100% (21 Dec 2018 06:35) (97% - 100%)                          14.7   20.71 )-----------( 91       ( 20 Dec 2018 18:49 )             42.1       12-20    140  |  97<L>  |  31<H>  ----------------------------<  185<H>  4.9   |  17<L>  |  0.75    Ca    10.0      20 Dec 2018 18:49    TPro  6.5  /  Alb  3.1<L>  /  TBili  5.2<H>  /  DBili  3.6<H>  /  AST  426<H>  /  ALT  475<H>  /  AlkPhos  424<H>  12-20        Chest xray: ******PRELIMINARY REPORT******    ******PRELIMINARY REPORT******            EXAM:  XR CHEST AP OR PA 1V        PROCEDURE DATE:  Dec 20 2018     ******PRELIMINARY REPORT******    ******PRELIMINARY REPORT******            INTERPRETATION:  multiple ovoid nodules, predominantly in the right lung,   suspicious for metastases.            ******PRELIMINARY REPORT******    ******PRELIMINARY REPORT******          FREDDIE LOBO M.D., RADIOLOGY RESIDENT

## 2018-12-21 NOTE — CONSULT NOTE ADULT - SUBJECTIVE AND OBJECTIVE BOX
Patient is a 63y old  Female who presents with a chief complaint of Weakness (21 Dec 2018 08:23)      HPI:    64yo Female, ambulatory with assistance, with h/o HTN and stage IV metastatic breast ca (liver, spine) Dx'd initially in  at [facility], [place]; The pt c/o worsening fatigue and generalized weakness. States that the symptoms have been worsening for past month, most recently to the degree that she would need assistance to ambulate. The pt also reports associated nausea and occasional vomiting and decreased oral intake for past few days. States that completed radiotherapy to her head today but feels no HA or confusion. Has chronic epigastric, RUQ pain which occurs sometime but not now. Pt requesting to transfer her oncological care to Dr. Eng. Otherwise she reports no fever, chills, cough, CP, SOB, Abd pain, diarrhea or urinary symptoms. Cannot recall her 2 BP medications.    ED course: 3L LR bolus; (21 Dec 2018 04:17)      REVIEW OF SYSTEMS:    CONSTITUTIONAL: No fever, weight loss, or fatigue  EYES: No eye pain, visual disturbances, or discharge  ENMT:  No sore throat  NECK: No pain or stiffness  RESPIRATORY: No cough, wheezing, chills or hemoptysis; No shortness of breath  CARDIOVASCULAR: No chest pain, palpitations, dizziness, or leg swelling  GASTROINTESTINAL: No abdominal or epigastric pain. No nausea, vomiting, or hematemesis; No diarrhea or constipation. No melena or hematochezia.  GENITOURINARY: No dysuria, frequency, hematuria, or incontinence  NEUROLOGICAL: No headaches, memory loss, loss of strength, numbness, or tremors  SKIN: No itching, burning, rashes, or lesions   LYMPH NODES: No enlarged glands  MUSCULOSKELETAL: No joint pain or swelling; No muscle, back, or extremity pain      PAST MEDICAL & SURGICAL HISTORY:  Hypertension  Metastatic breast cancer  History of right mastectomy      Allergies    No Known Allergies    Intolerances        FAMILY HISTORY:  Family history of hypertension (Mother)      SOCIAL HISTORY:        MEDICATIONS  (STANDING):  enoxaparin Injectable 40 milliGRAM(s) SubCutaneous every 24 hours  influenza   Vaccine 0.5 milliLiter(s) IntraMuscular once  nystatin    Suspension 930083 Unit(s) Oral four times a day  piperacillin/tazobactam IVPB. 3.375 Gram(s) IV Intermittent every 8 hours  polyethylene glycol 3350 17 Gram(s) Oral daily  senna 2 Tablet(s) Oral at bedtime    MEDICATIONS  (PRN):  ondansetron Injectable 4 milliGRAM(s) IV Push every 6 hours PRN Nausea and/or Vomiting      Vital Signs Last 24 Hrs  T(C): 36.7 (21 Dec 2018 10:04), Max: 37.6 (20 Dec 2018 17:30)  T(F): 98 (21 Dec 2018 10:04), Max: 99.6 (20 Dec 2018 17:30)  HR: 70 (21 Dec 2018 10:04) (70 - 101)  BP: 122/77 (21 Dec 2018 10:04) (101/80 - 133/90)  BP(mean): --  RR: 118 (21 Dec 2018 10:04) (16 - 118)  SpO2: 100% (21 Dec 2018 06:35) (97% - 100%)    PHYSICAL EXAM:    GENERAL: NAD, well-groomed  HEAD:  Atraumatic, Normocephalic  EYES: EOMI, PERRLA, conjunctiva and sclera clear  ENMT: No tonsillar erythema, exudates, or enlargement; Moist mucous membranes  NECK: Supple, No JVD  CHEST/LUNG: Clear to percussion bilaterally; No rales, rhonchi, wheezing, or rubs  HEART: Regular rate and rhythm; No murmurs, rubs, or gallops  ABDOMEN: Soft, Nontender, Nondistended; Bowel sounds present  EXTREMITIES:  2+ Peripheral Pulses, No clubbing, cyanosis, or edema  LYMPH: No lymphadenopathy noted  SKIN: No rashes or lesions    LABS:  CBC Full  -  ( 20 Dec 2018 18:49 )  WBC Count : 20.71 K/uL  Hemoglobin : 14.7 g/dL  Hematocrit : 42.1 %  Platelet Count - Automated : 91 K/uL  Mean Cell Volume : 76.1 fL  Mean Cell Hemoglobin : 26.6 pg  Mean Cell Hemoglobin Concentration : 34.9 %  Auto Neutrophil # : 16.71 K/uL  Auto Lymphocyte # : 1.25 K/uL  Auto Monocyte # : 1.95 K/uL  Auto Eosinophil # : 0.00 K/uL  Auto Basophil # : 0.09 K/uL  Auto Neutrophil % : 80.8 %  Auto Lymphocyte % : 6.0 %  Auto Monocyte % : 9.4 %  Auto Eosinophil % : 0.0 %  Auto Basophil % : 0.4 %      12-20    140  |  97<L>  |  31<H>  ----------------------------<  185<H>  4.9   |  17<L>  |  0.75    Ca    10.0      20 Dec 2018 18:49    TPro  6.5  /  Alb  3.1<L>  /  TBili  5.2<H>  /  DBili  3.6<H>  /  AST  426<H>  /  ALT  475<H>  /  AlkPhos  424<H>  12-20      LIVER FUNCTIONS - ( 20 Dec 2018 18:49 )  Alb: 3.1 g/dL / Pro: 6.5 g/dL / ALK PHOS: 424 u/L / ALT: 475 u/L / AST: 426 u/L / GGT: x                               MICROBIOLOGY:        Urinalysis Basic - ( 21 Dec 2018 09:45 )    Color: DARK YELLOW / Appearance: Lt TURBID / S.025 / pH: 6.0  Gluc: NEGATIVE / Ketone: TRACE  / Bili: TRACE / Urobili: MODERATE   Blood: TRACE / Protein: 20 / Nitrite: NEGATIVE   Leuk Esterase: SMALL / RBC: 6-10 / WBC 6-10   Sq Epi: FEW / Non Sq Epi: x / Bacteria: MODERATE      Culture - Blood (18 @ 19:12)    Culture - Blood:   ***Blood Panel PCR results on this specimen are available  approximately 3 hours after the Gram stain result***  Gram stain, PCR, and/or culture results may not always  correspond due to difference in methodologies  ------------------------------------------------------------  This PCR assay was performed using [employer].  The  following targets are tested for:  Enterococcus, vancomycin  resistant enterococci, Listeria monocytogenes,  coagulase  negative staphylococci, S. aureus, methicillin resistant S.  aureus, Streptococcus agalactiae (Group B), S. pneumoniae,  S. pyogenes (Group A), Acinetobacter baumannii, Enterobacter  cloacae, E. coli, Klebsiella oxytoca, K. pneumoniae, Proteus  sp., Serratia marcescens, Haemophilus influenzae, Neisseria  meningitidis, Pseudomonas aeruginosa, Candida albicans, C.  glabrata, C. krusei, C. parapsilosis, C. tropicalis and the  KPC resistance gene.  **NOTE: Due to technical problems, Proteus sp. will NOT be  reported as part of the BCID paneluntil further notice.    -  Escherichia coli: + DETECT RENETTA    Specimen Source: BLOOD VENOUS    Organism: BLOOD CULTURE PCR    Gram Stain Blood:   ***** CRITICAL RESULT *****  PERSON CALLED / READ-BACK: ELISE FINNEGAN  DATE / TIME CALLED: 18  CALLED BY: JORGE LO^Gram Neg Rods  AFTER: 10 HOURS INCUBATION  BOTTLE: AEROBIC   ANAEROBIC BOTTLES    Organism Identification: BLOOD CULTURE PCR    Method Type: PCR                RADIOLOGY:    < from: Xray Chest 1 View AP/PA (18 @ 19:20) >    FINDINGS:  Multiple well-circumscribed opacities throughout the bilateral lung   fields, most consistent with metastatic breast cancer. The right   hemidiaphragm is elevated. No pleural effusions or pneumothorax. The   cardiomediastinal silhouette is poorly evaluated on this projection. No   acute osseous findings.    IMPRESSION:   Findings consistent with metastatic lung disease. More accurate   evaluation could be obtained by chest CT if clinically indicated.    < end of copied text > Patient is a 63y old  Female who presents with a chief complaint of Weakness (21 Dec 2018 08:23)      HPI:    62yo Female, ambulatory with assistance, with h/o HTN and stage IV metastatic breast ca (liver, spine) Dx'd initially in  at [facility], [place]; The pt c/o worsening fatigue and generalized weakness. States that the symptoms have been worsening for past month, most recently to the degree that she would need assistance to ambulate. The pt also reports associated nausea and occasional vomiting and decreased oral intake for past few days. States that completed radiotherapy to her head today but feels no HA or confusion. Has chronic epigastric, RUQ pain which occurs sometime but not now. Pt requesting to transfer her oncological care to Dr. Eng. Otherwise she reports no fever, chills, cough, CP, SOB, Abd pain, diarrhea or urinary symptoms. Cannot recall her 2 BP medications.    ED course: 3L LR bolus; (21 Dec 2018 04:17)    ER vitals:  T 97.7, P 101, /79.  WBC 20.7 (80% N).  LFTs and TB elevated.  Lact 13.  UA (-)nit/small LE, with 6-10 WBC.  Cxr s/o metastatic disease.  Bcx PCR (+) E.coli.  ID consult called for further abx managment.    Pt c/o lower abd pain both quandrants.        REVIEW OF SYSTEMS:    CONSTITUTIONAL: fatigue/weakness  EYES: No eye pain, visual disturbances, or discharge  ENMT:  No sore throat  NECK: No pain or stiffness  RESPIRATORY: No cough, wheezing, chills or hemoptysis; No shortness of breath  CARDIOVASCULAR: No chest pain, palpitations, dizziness, or leg swelling  GASTROINTESTINAL: (+) N/V, and lower abd pain from L to R  GENITOURINARY: No dysuria, frequency, hematuria, or incontinence  NEUROLOGICAL: No headaches, memory loss, loss of strength, numbness, or tremors  SKIN: No itching, burning, rashes, or lesions   LYMPH NODES: No enlarged glands  MUSCULOSKELETAL: No joint pain or swelling; No muscle, back, or extremity pain      PAST MEDICAL & SURGICAL HISTORY:  Hypertension  Metastatic breast cancer  History of right mastectomy      Allergies    No Known Allergies    Intolerances        FAMILY HISTORY:  Family history of hypertension (Mother)      SOCIAL HISTORY:      	Lives with children  	Reports no h/o alcohol, tobacco or IV drug use  Used to work as     MEDICATIONS  (STANDING):  enoxaparin Injectable 40 milliGRAM(s) SubCutaneous every 24 hours  influenza   Vaccine 0.5 milliLiter(s) IntraMuscular once  nystatin    Suspension 332732 Unit(s) Oral four times a day  piperacillin/tazobactam IVPB. 3.375 Gram(s) IV Intermittent every 8 hours  polyethylene glycol 3350 17 Gram(s) Oral daily  senna 2 Tablet(s) Oral at bedtime    MEDICATIONS  (PRN):  ondansetron Injectable 4 milliGRAM(s) IV Push every 6 hours PRN Nausea and/or Vomiting      Vital Signs Last 24 Hrs  T(C): 36.7 (21 Dec 2018 10:04), Max: 37.6 (20 Dec 2018 17:30)  T(F): 98 (21 Dec 2018 10:04), Max: 99.6 (20 Dec 2018 17:30)  HR: 70 (21 Dec 2018 10:04) (70 - 101)  BP: 122/77 (21 Dec 2018 10:04) (101/80 - 133/90)  BP(mean): --  RR: 118 (21 Dec 2018 10:04) (16 - 118)  SpO2: 100% (21 Dec 2018 06:35) (97% - 100%)    PHYSICAL EXAM:    GENERAL: NAD, well-groomed  HEAD:  Atraumatic, Normocephalic  EYES: EOMI, PERRLA, conjunctiva and sclera clear  ENMT: No tonsillar erythema, exudates, or enlargement; Moist mucous membranes  NECK: Supple, No JVD  CHEST/LUNG: Clear to percussion bilaterally; No rales, rhonchi, wheezing, or rubs  HEART: Regular rate and rhythm; No murmurs, rubs, or gallops  ABDOMEN: Soft, Nontender, Nondistended; Bowel sounds present  EXTREMITIES:  b/l LE edema  LYMPH: No lymphadenopathy noted  SKIN: No rashes or lesions    LABS:  CBC Full  -  ( 20 Dec 2018 18:49 )  WBC Count : 20.71 K/uL  Hemoglobin : 14.7 g/dL  Hematocrit : 42.1 %  Platelet Count - Automated : 91 K/uL  Mean Cell Volume : 76.1 fL  Mean Cell Hemoglobin : 26.6 pg  Mean Cell Hemoglobin Concentration : 34.9 %  Auto Neutrophil # : 16.71 K/uL  Auto Lymphocyte # : 1.25 K/uL  Auto Monocyte # : 1.95 K/uL  Auto Eosinophil # : 0.00 K/uL  Auto Basophil # : 0.09 K/uL  Auto Neutrophil % : 80.8 %  Auto Lymphocyte % : 6.0 %  Auto Monocyte % : 9.4 %  Auto Eosinophil % : 0.0 %  Auto Basophil % : 0.4 %      12    140  |  97<L>  |  31<H>  ----------------------------<  185<H>  4.9   |  17<L>  |  0.75    Ca    10.0      20 Dec 2018 18:49    TPro  6.5  /  Alb  3.1<L>  /  TBili  5.2<H>  /  DBili  3.6<H>  /  AST  426<H>  /  ALT  475<H>  /  AlkPhos  424<H>  12-20      LIVER FUNCTIONS - ( 20 Dec 2018 18:49 )  Alb: 3.1 g/dL / Pro: 6.5 g/dL / ALK PHOS: 424 u/L / ALT: 475 u/L / AST: 426 u/L / GGT: x                               MICROBIOLOGY:        Urinalysis Basic - ( 21 Dec 2018 09:45 )    Color: DARK YELLOW / Appearance: Lt TURBID / S.025 / pH: 6.0  Gluc: NEGATIVE / Ketone: TRACE  / Bili: TRACE / Urobili: MODERATE   Blood: TRACE / Protein: 20 / Nitrite: NEGATIVE   Leuk Esterase: SMALL / RBC: 6-10 / WBC 6-10   Sq Epi: FEW / Non Sq Epi: x / Bacteria: MODERATE      Culture - Blood (18 @ 19:12)    Culture - Blood:   ***Blood Panel PCR results on this specimen are available  approximately 3 hours after the Gram stain result***  Gram stain, PCR, and/or culture results may not always  correspond due to difference in methodologies  ------------------------------------------------------------  This PCR assay was performed using [employer].  The  following targets are tested for:  Enterococcus, vancomycin  resistant enterococci, Listeria monocytogenes,  coagulase  negative staphylococci, S. aureus, methicillin resistant S.  aureus, Streptococcus agalactiae (Group B), S. pneumoniae,  S. pyogenes (Group A), Acinetobacter baumannii, Enterobacter  cloacae, E. coli, Klebsiella oxytoca, K. pneumoniae, Proteus  sp., Serratia marcescens, Haemophilus influenzae, Neisseria  meningitidis, Pseudomonas aeruginosa, Candida albicans, C.  glabrata, C. krusei, C. parapsilosis, C. tropicalis and the  KPC resistance gene.  **NOTE: Due to technical problems, Proteus sp. will NOT be  reported as part of the BCID paneluntil further notice.    -  Escherichia coli: + DETECT RENETTA    Specimen Source: BLOOD VENOUS    Organism: BLOOD CULTURE PCR    Gram Stain Blood:   ***** CRITICAL RESULT *****  PERSON CALLED / READ-BACK: ELISE FINNEGAN  DATE / TIME CALLED: 18 0642  CALLED BY: JORGE LO^Gram Neg Rods  AFTER: 10 HOURS INCUBATION  BOTTLE: AEROBIC   ANAEROBIC BOTTLES    Organism Identification: BLOOD CULTURE PCR    Method Type: PCR                RADIOLOGY:    < from: Xray Chest 1 View AP/PA (18 @ 19:20) >    FINDINGS:  Multiple well-circumscribed opacities throughout the bilateral lung   fields, most consistent with metastatic breast cancer. The right   hemidiaphragm is elevated. No pleural effusions or pneumothorax. The   cardiomediastinal silhouette is poorly evaluated on this projection. No   acute osseous findings.    IMPRESSION:   Findings consistent with metastatic lung disease. More accurate   evaluation could be obtained by chest CT if clinically indicated.    < end of copied text >

## 2018-12-21 NOTE — H&P ADULT - ASSESSMENT
62yo Female, ambulatory with assistance, with h/o HTN and stage IV metastatic breast ca (liver, spine) [facility], generalized weakness and elevated LFTs with bilirubin likely due to metastatic disease, r/o sepsis; 62yo Female, ambulatory with assistance, with h/o HTN and stage IV metastatic breast ca (liver, spine) Dx'd initially in 2015 at [facility], [place] a/w SIRS, generalized weakness and elevated LFTs with bilirubin likely due to metastatic disease, r/o sepsis; Found to have extensive oral candidiasis;

## 2018-12-21 NOTE — H&P ADULT - PROBLEM SELECTOR PLAN 1
Likely due to metastatic disease burden; No clinical evidence of infection; High lactate likely reflects hepatic disfunction due to mets and overall tumor burden;   -f/u BCx  -f/u UA (pending)  -trend lactate   -IV hydration, s/p 3L LR in ED  -hold Abx  -CXR not c/w PNA, f/u formal report

## 2018-12-21 NOTE — H&P ADULT - PROBLEM SELECTOR PLAN 4
Likely due to SIRS and metastatic tumor burden;   -PT eval  -fall precautions   -Nutritionist c/s in am

## 2018-12-21 NOTE — PHYSICAL THERAPY INITIAL EVALUATION ADULT - PERTINENT HX OF CURRENT PROBLEM, REHAB EVAL
64yo Female with hx of HTN and stage IV metastatic breast ca (liver, spine) Dx'd initially in 2015 at [facility], [place]; The pt c/o worsening fatigue and generalized weakness.

## 2018-12-21 NOTE — H&P ADULT - LYMPHATIC
supraclavicular L/anterior cervical R/supraclavicular R/posterior cervical R/posterior cervical L/anterior cervical L

## 2018-12-21 NOTE — H&P ADULT - PROBLEM SELECTOR PLAN 5
Reports no odynophagia at this time;   -Nystatin q6h  -Monitor for improvement   -holding off on PO Fluconazole given likely extensive metastatic involvement of the liver

## 2018-12-21 NOTE — H&P ADULT - HISTORY OF PRESENT ILLNESS
64yo Female with h/o stage IV metastatic breast ca (liver, spine) presenting with fatigue. Pt notes worsening fatigue for past month also reports associated nausea and vomiting for past few days. Completed radiotherapy today. Has chronic epigastric, RUQ pain likely 2/2 to mets. Pt requesting to transfer oncological care to Dr Eng. Otherwise reports no fever, chill, cough, CP or SOB.    ED course: 3L LR bolus; 62yo Female, ambulatory with assistance, with h/o HTN and stage IV metastatic breast ca (liver, spine) Dx'd initially in 2015 at [facility], [place]; The pt c/o worsening fatigue and generalized weakness. States that the symptoms have been worsening for past month, most recently to the degree that she would need assistance to ambulate. The pt also reports associated nausea and occasional vomiting and decreased oral intake for past few days. States that completed radiotherapy to her head today but feels no HA or confusion. Has chronic epigastric, RUQ pain which occurs sometime but not now. Pt requesting to transfer her oncological care to Dr. Eng. Otherwise she reports no fever, chills, cough, CP, SOB, Abd pain, diarrhea or urinary symptoms. Cannot recall her 2 BP medications.    ED course: 3L LR bolus;

## 2018-12-21 NOTE — H&P ADULT - PROBLEM SELECTOR PLAN 8
Unable to recall home medications;  -will request assistance from med-rec pharmacist to obtain current home meds

## 2018-12-21 NOTE — H&P ADULT - PROBLEM SELECTOR PLAN 3
Likely due to liver mets; No prior results to compare;  -f/u CT A/P w/ contrast  -Monitor liver function   -Acute Hepatitis panel in am

## 2018-12-21 NOTE — CONSULT NOTE ADULT - ASSESSMENT
62yo Female, ambulatory with assistance, with h/o HTN and stage IV metastatic breast ca (liver, spine) Dx'd initially in 2015 at [facility], [place]; The pt c/o worsening fatigue and generalized weakness. States that the symptoms have been worsening for past month, most recently to the degree that she would need assistance to ambulate. The pt also reports associated nausea and occasional vomiting and decreased oral intake for past few days. States that completed radiotherapy to her head today but feels no HA or confusion. Has chronic epigastric, RUQ pain which occurs sometime but not now. Pt requesting to transfer her oncological care to Dr. Eng. Otherwise she reports no fever, chills, cough, CP, SOB, Abd pain, diarrhea or urinary symptoms. Cannot recall her 2 BP medications.    ED course: 3L LR bolus; (21 Dec 2018 04:17)    ER vitals:  T 97.7, P 101, /79.  WBC 20.7 (80% N).  LFTs and TB elevated.  Lact 13.  UA (-)nit/small LE, with 6-10 WBC.  Cxr s/o metastatic disease.  Bcx PCR (+) E.coli.  ID consult called for further abx managment.    Pt c/o lower abd pain both quandrants.      Recommend:    - E.coli bacteremia - source possible hepatobiliary vs. urinary.  Agree with CTap with IV contrast for further evaluation for infectious source.  f/u Urine cultures    - f/u blood cultures and sensitivities.  Check repeat bcx to ensure clearance.    - Cont zosyn for gram negative coverage    - LFTs and bilirubin elevated.  f/u CTap.  Trend LFTs, check acute hepatitis panel, CMV, EBV    - Pt with oral thrush - cont nystatin swish/swallow.   Monitor for clinical improvement.      Will follow,    Shelly Phillips  [phone]

## 2018-12-22 NOTE — CHART NOTE - NSCHARTNOTEFT_GEN_A_CORE
Attempted to reach out to [facility] multiple times for medical records. Unable to reach. Will follow.     ADS  [id number]

## 2018-12-23 NOTE — DIETITIAN INITIAL EVALUATION ADULT. - PERTINENT MEDS FT
MEDICATIONS  (STANDING):  dextrose 5% + sodium chloride 0.9%. 1000 milliLiter(s) (50 mL/Hr) IV Continuous <Continuous>  enoxaparin Injectable 40 milliGRAM(s) SubCutaneous every 24 hours  influenza   Vaccine 0.5 milliLiter(s) IntraMuscular once  nystatin    Suspension 222887 Unit(s) Oral four times a day  piperacillin/tazobactam IVPB. 3.375 Gram(s) IV Intermittent every 8 hours  polyethylene glycol 3350 17 Gram(s) Oral daily  senna 2 Tablet(s) Oral at bedtime

## 2018-12-23 NOTE — DIETITIAN INITIAL EVALUATION ADULT. - NS AS NUTRI INTERV MEALS SNACK
1. Continue mechanical soft diet. 2. Encourage PO intake and honor food preferences as able. 3. Monitor weights, labs, BM's, skin integrity, p.o. intake.

## 2018-12-23 NOTE — DIETITIAN INITIAL EVALUATION ADULT. - OTHER INFO
Pt seen for nutrition consult for generalized weakness and hypoalbuminemia in setting of metastatic disease. Pt is a 64 y/o F with stage IV breast cancer with mets to liver and spine here with SIRS and oral candidiasis. Pt states she is only drinking fluids, Ensure was present at bedside. Per RN, last BM 11/22. No GI distress (nausea/vomiting/diarrhea/constipation.) Pt reported UBW is 198 pounds; current weight (12/21) is 175.4 pounds. Pt unable to state time frame of wt loss (11.4% UBW).

## 2018-12-23 NOTE — CHART NOTE - NSCHARTNOTEFT_GEN_A_CORE
NUTRITION SERVICES     Upon Nutritional Assessment by the Registered Dietitian your patient was determined to meet criteria/ has evidence of the following diagnosis/diagnoses:  [ ] Mild Protein Calorie Malnutrition   [ ] Moderate Protein Calorie Malnutrition   [ x] Severe Protein Calorie Malnutrition   [ ] Unspecified Protein Calorie Malnutrition   [ ] Underweight / BMI <19  [ ] Morbid Obesity / BMI >40    Findings as based on:  •  Comprehensive nutritional assessment and consultation    Please refer to Initial Dietitian Evaluation via documents section of [employer] EMR for further recommendations.

## 2018-12-23 NOTE — DIETITIAN INITIAL EVALUATION ADULT. - PERTINENT LABORATORY DATA
12-23 Na 144 mmol/L Glu 144 mg/dL<H> K+ 3.9 mmol/L Cr 0.73 mg/dL BUN 24 mg/dL<H>  Alb 2.6 g/dL<L>  Hgb 13.4 g/dL Hct 37.5 %  Glucose, Serum: 144 mg/dL <H>

## 2018-12-24 NOTE — CONSULT NOTE ADULT - ASSESSMENT
62yo Female with h/o HTN and stage IV metastatic breast ca (liver, spine) Dx'd initially in 2015 at [facility], [place] presented with worsening fatigue and generalized weakness, later found to have elevated LFTs with bilirubin likely due to metastatic disease, extensive oral candidiasis, and septicemia. Palliative consulted for GOC.

## 2018-12-24 NOTE — CONSULT NOTE ADULT - SUBJECTIVE AND OBJECTIVE BOX
HPI:  64yo Female, ambulatory with assistance, with h/o HTN and stage IV metastatic breast ca (liver, spine) [facility] The pt c/o worsening fatigue and generalized weakness. States that the symptoms have been worsening for past month, most recently to the degree that she would need assistance to ambulate. The pt also reports associated nausea and occasional vomiting and decreased oral intake for past few days. States that completed radiotherapy to her head today but feels no HA or confusion. Has chronic epigastric, RUQ pain which occurs sometime but not now. Pt requesting to transfer her oncological care to Dr. Eng. Otherwise she reports no fever, chills, cough, CP, SOB, Abd pain, diarrhea or urinary symptoms. Cannot recall her 2 BP medications.    ED course: 3L LR bolus; (21 Dec 2018 04:17)    PERTINENT PM/SXH:   Hypertension  Metastatic breast cancer    History of right mastectomy    FAMILY HISTORY:  Family history of hypertension (Mother)    ITEMS NOT CHECKED ARE NOT PRESENT    SOCIAL HISTORY:   Significant other/partner:  [ ]  Children:  [ ]  Nondenominational/Spirituality:  Substance hx:  [ ]   Tobacco hx:  [ ]   Alcohol hx: [ ]   Home Opioid hx:  [ ] I-Stop Reference No:  Living Situation: [ ]Home  [ ]Long term care  [ ]Rehab [ ]Other    ADVANCE DIRECTIVES:    DNR  MOLST  [ ]  Living Will  [ ]   DECISION MAKER(s):  [ ] Health Care Proxy(s)  [ ] Surrogate(s)  [ ] Guardian           Name(s): Phone Number(s):    BASELINE (I)ADL(s) (prior to admission):  [place]: [ ]Total  [ ] Moderate [ ]Dependent    Allergies    No Known Allergies    Intolerances    MEDICATIONS  (STANDING):  dextrose 5% + sodium chloride 0.9%. 1000 milliLiter(s) (50 mL/Hr) IV Continuous <Continuous>  enoxaparin Injectable 40 milliGRAM(s) SubCutaneous every 24 hours  influenza   Vaccine 0.5 milliLiter(s) IntraMuscular once  nystatin    Suspension 593554 Unit(s) Oral four times a day  piperacillin/tazobactam IVPB. 3.375 Gram(s) IV Intermittent every 8 hours  polyethylene glycol 3350 17 Gram(s) Oral daily  senna 2 Tablet(s) Oral at bedtime    MEDICATIONS  (PRN):  metoclopramide Injectable 10 milliGRAM(s) IV Push every 6 hours PRN Nausea/vomiting  ondansetron Injectable 4 milliGRAM(s) IV Push every 6 hours PRN Nausea and/or Vomiting  oxyCODONE    IR 5 milliGRAM(s) Oral every 8 hours PRN Moderate to Severe Pain    PRESENT SYMPTOMS: [ ]Unable to obtain due to poor mentation   Source if other than patient:  [ ]Family   [ ]Team     Pain (Impact on QOL):    Location -         Minimal acceptable level (0-10 scale):                    Aggravating factors -  Quality -  Radiation -  Severity (0-10 scale) -    Timing -    PAIN AD Score:     [email or web address] (press ctrl +  left click to view)    Dyspnea:                           [ ]Mild [ ]Moderate [ ]Severe  Anxiety:                             [ ]Mild [ ]Moderate [ ]Severe  Fatigue:                             [ ]Mild [ ]Moderate [ ]Severe  Nausea:                             [ ]Mild [ ]Moderate [ ]Severe  Loss of appetite:              [ ]Mild [ ]Moderate [ ]Severe  Constipation:                    [ ]Mild [ ]Moderate [ ]Severe    Other Symptoms:  [ ]All other review of systems negative     Karnofsky Performance Score/Palliative Performance Status Version 2:         %    http://palliative.info/resource_material/PPSv2.pdf  PHYSICAL EXAM:  Vital Signs Last 24 Hrs  T(C): 36.5 (24 Dec 2018 13:03), Max: 36.7 (24 Dec 2018 09:08)  T(F): 97.7 (24 Dec 2018 13:03), Max: 98 (24 Dec 2018 09:08)  HR: 90 (24 Dec 2018 13:03) (85 - 98)  BP: 103/66 (24 Dec 2018 13:03) (101/79 - 111/68)  BP(mean): --  RR: 17 (24 Dec 2018 13:03) (16 - 18)  SpO2: 99% (24 Dec 2018 13:03) (96% - 100%) I&O's Summary    23 Dec 2018 07:01  -  24 Dec 2018 07:00  --------------------------------------------------------  IN: 0 mL / OUT: 1600 mL / NET: -1600 mL    24 Dec 2018 07:01  -  24 Dec 2018 15:05  --------------------------------------------------------  IN: 0 mL / OUT: 700 mL / NET: -700 mL    GENERAL:  [ ]Alert  [ ]Oriented x   [ ]Lethargic  [ ]Cachexia  [ ]Unarousable  [ ]Verbal  [ ]Non-Verbal  Behavioral:   [ ] Anxiety  [ ] Delirium [ ] Agitation [ ] Other  HEENT:  [ ]Normal   [ ]Dry mouth   [ ]ET Tube/Trach  [ ]Oral lesions  PULMONARY:   [ ]Clear [ ]Tachypnea  [ ]Audible excessive secretions   [ ]Rhonchi        [ ]Right [ ]Left [ ]Bilateral  [ ]Crackles        [ ]Right [ ]Left [ ]Bilateral  [ ]Wheezing     [ ]Right [ ]Left [ ]Bilateral  CARDIOVASCULAR:    [ ]Regular [ ]Irregular [ ]Tachy  [ ]Wally [ ]Murmur [ ]Other  GASTROINTESTINAL:  [ ]Soft  [ ]Distended   [ ]+BS  [ ]Non tender [ ]Tender  [ ]PEG [ ]OGT/ NGT  Last BM:   GENITOURINARY:  [ ]Normal [ ] Incontinent   [ ]Oliguria/Anuria   [ ]Olivares  MUSCULOSKELETAL:   [ ]Normal   [ ]Weakness  [ ]Bed/Wheelchair bound [ ]Edema  NEUROLOGIC:   [ ]No focal deficits  [ ] Cognitive impairment  [ ] Dysphagia [ ]Dysarthria [ ] Paresis [ ]Other   SKIN:   [ ]Normal   [ ]Pressure ulcer(s)  [ ]Rash    CRITICAL CARE:  [ ] Shock Present  [ ]Septic [ ]Cardiogenic [ ]Neurologic [ ]Hypovolemic  [ ]  Vasopressors [ ]  Inotropes   [ ] Respiratory failure present  [ ] Acute  [ ] Chronic [ ] Hypoxic  [ ] Hypercarbic [ ] Other  [ ] Other organ failure     LABS:                        13.4   13.42 )-----------( 47       ( 23 Dec 2018 06:23 )             37.5   12-24    142  |  104  |  20  ----------------------------<  127<H>  3.8   |  20<L>  |  0.69    Ca    8.6      24 Dec 2018 05:49    TPro  5.0<L>  /  Alb  2.4<L>  /  TBili  8.5<H>  /  DBili  x   /  AST  882<H>  /  ALT  505<H>  /  AlkPhos  319<H>  12-24        RADIOLOGY & ADDITIONAL STUDIES:    PROTEIN CALORIE MALNUTRITION PRESENT: [ ] Yes [ ] No  [ ] PPSV2 < or = to 30% [ ] significant weight loss  [ ] poor nutritional intake [ ] catabolic state [ ] anasarca     Albumin, Serum: 2.4 g/dL (12-24-18 @ 05:49)  Artificial Nutrition [ ]     REFERRALS:   [ ]Chaplaincy  [ ] Hospice  [ ]Child Life  [ ]Social Work  [ ]Case management [ ]Holistic Therapy   Goals of Care Discussion Document: HPI:  62yo Female, ambulatory with assistance, with h/o HTN and stage IV metastatic breast ca (liver, spine) [facility] The pt c/o worsening fatigue and generalized weakness. States that the symptoms have been worsening for past month, most recently to the degree that she would need assistance to ambulate. The pt also reports associated nausea and occasional vomiting and decreased oral intake for past few days. States that completed radiotherapy to her head today but feels no HA or confusion. Has chronic epigastric, RUQ pain which occurs sometime but not now. Pt requesting to transfer her oncological care to Dr. Eng. Otherwise she reports no fever, chills, cough, CP, SOB, Abd pain, diarrhea or urinary symptoms. Cannot recall her 2 BP medications.    ED course: 3L LR bolus; (21 Dec 2018 04:17)    PERTINENT PM/SXH:   Hypertension  Metastatic breast cancer    History of right mastectomy    FAMILY HISTORY:  Family history of hypertension (Mother)    ITEMS NOT CHECKED ARE NOT PRESENT    SOCIAL HISTORY:   Significant other/partner:  [x ]  Children:  [ ]  Faith/Spirituality:  Substance hx:  [ ]   Tobacco hx:  [ ]   Alcohol hx: [ ]   Home Opioid hx:  [ ] I-Stop Reference No:  Living Situation: [x ]Home  [ ]Long term care  [ ]Rehab [ ]Other    ADVANCE DIRECTIVES:    DNR  MOLST  [ ]  Living Will  [ ]   DECISION MAKER(s):  [ ] Health Care Proxy(s)  [x ] Surrogate(s) Patient has 3 birth children and many adopted children  [ ] Guardian           Name(s): Phone Number(s): Nicko Bell    BASELINE (I)ADL(s) (prior to admission):  [place]: [ ]Total  [ ] Moderate [x ]Dependent    Allergies  No Known Allergies  Intolerances    MEDICATIONS  (STANDING):  dextrose 5% + sodium chloride 0.9%. 1000 milliLiter(s) (50 mL/Hr) IV Continuous <Continuous>  enoxaparin Injectable 40 milliGRAM(s) SubCutaneous every 24 hours  influenza   Vaccine 0.5 milliLiter(s) IntraMuscular once  nystatin    Suspension [id number] Unit(s) Oral four times a day  piperacillin/tazobactam IVPB. 3.375 Gram(s) IV Intermittent every 8 hours  polyethylene glycol 3350 17 Gram(s) Oral daily  senna 2 Tablet(s) Oral at bedtime    MEDICATIONS  (PRN):  metoclopramide Injectable 10 milliGRAM(s) IV Push every 6 hours PRN Nausea/vomiting  ondansetron Injectable 4 milliGRAM(s) IV Push every 6 hours PRN Nausea and/or Vomiting  oxyCODONE    IR 5 milliGRAM(s) Oral every 8 hours PRN Moderate to Severe Pain    PRESENT SYMPTOMS: [x ]Unable to obtain due to poor mentation   Source if other than patient:  [x ]Family   [x ]Team     Pain (Impact on QOL):    Location -         Minimal acceptable level (0-10 scale):                    Aggravating factors -  Quality -  Radiation -  Severity (0-10 scale) -    Timing -    PAIN AD Score: 7-8     [email or web address] (press ctrl +  left click to view)    Dyspnea:                           [ ]Mild [ ]Moderate [ ]Severe  Anxiety:                             [ ]Mild [ ]Moderate [ ]Severe  Fatigue:                             [ ]Mild [ ]Moderate [ ]Severe  Nausea:                             [ ]Mild [ ]Moderate [ ]Severe  Loss of appetite:              [ ]Mild [ ]Moderate [ ]Severe  Constipation:                    [ ]Mild [ ]Moderate [ ]Severe    Other Symptoms:  [ ]All other review of systems negative     Karnofsky Performance Score/Palliative Performance Status Version 2:   30      %    http://palliative.info/resource_material/PPSv2.pdf    PHYSICAL EXAM:  Vital Signs Last 24 Hrs  T(C): 36.5 (24 Dec 2018 13:03), Max: 36.7 (24 Dec 2018 09:08)  T(F): 97.7 (24 Dec 2018 13:03), Max: 98 (24 Dec 2018 09:08)  HR: 90 (24 Dec 2018 13:03) (85 - 98)  BP: 103/66 (24 Dec 2018 13:03) (101/79 - 111/68)  BP(mean): --  RR: 17 (24 Dec 2018 13:03) (16 - 18)  SpO2: 99% (24 Dec 2018 13:03) (96% - 100%) I&O's Summary    23 Dec 2018 07:01  -  24 Dec 2018 07:00  --------------------------------------------------------  IN: 0 mL / OUT: 1600 mL / NET: -1600 mL    24 Dec 2018 07:01  -  24 Dec 2018 15:05  --------------------------------------------------------  IN: 0 mL / OUT: 700 mL / NET: -700 mL    GENERAL:  [ ]Alert  [ ]Oriented x   [x ]Lethargic  [ ]Cachexia  [ ]Unarousable  [ ]Verbal  [ ]Non-Verbal  Behavioral:   [ ] Anxiety  [ x] Delirium [ ] Agitation [ ] Other  HEENT:  [x ]Normal   [ ]Dry mouth   [ ]ET Tube/Trach  [ ]Oral lesions  PULMONARY:   [x ]Clear [ ]Tachypnea  [ ]Audible excessive secretions   [ ]Rhonchi        [ ]Right [ ]Left [ ]Bilateral  [ ]Crackles        [ ]Right [ ]Left [ ]Bilateral  [ ]Wheezing     [ ]Right [ ]Left [ ]Bilateral  CARDIOVASCULAR:    [x ]Regular [ ]Irregular [ ]Tachy  [ ]Wally [ ]Murmur [ ]Other  GASTROINTESTINAL:  [ ]Soft  [ ]Distended   [ ]+BS  [ ]Non tender [x ]Tender  [ ]PEG [ ]OGT/ NGT  Last BM: 12/22  GENITOURINARY:  [ ]Normal [ ] Incontinent   [ ]Oliguria/Anuria   [x ]Olivares  MUSCULOSKELETAL:   [ ]Normal   [ ]Weakness  [x ]Bed/Wheelchair bound [ ]Edema  NEUROLOGIC:   [ ]No focal deficits  [ x] Cognitive impairment  [ ] Dysphagia [ ]Dysarthria [ ] Paresis [ ]Other   SKIN:   [ ]Normal   [ ]Pressure ulcer(s)  [ ]Rash    CRITICAL CARE:  [ ] Shock Present  [x ]Septic [ ]Cardiogenic [ ]Neurologic [ ]Hypovolemic  [ ]  Vasopressors [ ]  Inotropes   [ ] Respiratory failure present  [ ] Acute  [ ] Chronic [ ] Hypoxic  [ ] Hypercarbic [ ] Other  [ ] Other organ failure     LABS:                        13.4   13.42 )-----------( 47       ( 23 Dec 2018 06:23 )             37.5   12-24    142  |  104  |  20  ----------------------------<  127<H>  3.8   |  20<L>  |  0.69    Ca    8.6      24 Dec 2018 05:49    TPro  5.0<L>  /  Alb  2.4<L>  /  TBili  8.5<H>  /  DBili  x   /  AST  882<H>  /  ALT  505<H>  /  AlkPhos  319<H>  12-24        RADIOLOGY & ADDITIONAL STUDIES:    PROTEIN CALORIE MALNUTRITION PRESENT: [x ] Yes [ ] No  [x ] PPSV2 < or = to 30% [ ] significant weight loss  [ ] poor nutritional intake [ ] catabolic state [ ] anasarca     Albumin, Serum: 2.4 g/dL (12-24-18 @ 05:49)  Artificial Nutrition [ ]     REFERRALS:   [ ]Chaplaincy  [ ] Hospice  [ ]Child Life  [ ]Social Work  [ ]Case management [ ]Holistic Therapy   Goals of Care Discussion Document:

## 2018-12-24 NOTE — CONSULT NOTE ADULT - PROBLEM SELECTOR RECOMMENDATION 6
Palliative care philosophy introduced to patients children and grand children  Family meeting held discussing prognosis and code status   Family needs time to process and decide on GOC  Will touch base on Wednesday regarding f/u family meeting    Patient remains FULL CODE

## 2018-12-25 NOTE — CHART NOTE - NSCHARTNOTEFT_GEN_A_CORE
CMV pcr (+) with high viremia.  Given pt likely immunocompromised (metastatic breast cancer) and elevated LFTs will opt to treat with IV ganciclovir (5mg/kg q12).  Monitor daily CBC for myelosuppression (pancytopenia), and monitor renal function.      Will follow,    d/w Dr. Velasquez Bravo Rai  [phone]

## 2018-12-26 NOTE — PROGRESS NOTE ADULT - PROBLEM SELECTOR PLAN 3
RUQ pain likely 2/2 tumor burden. Currently pain 8/10   DC oxycodone 5 mg po Q8H prn. Patient waxes and wanes so an IV route is preferred. Renal function is preserved.   Recommend to start on Morphine 2 mg Q4H PRN.

## 2018-12-26 NOTE — PROGRESS NOTE ADULT - PROBLEM SELECTOR PLAN 2
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 GOC to be discussed with palliative team meeting with family tomorrow.

## 2018-12-26 NOTE — PROGRESS NOTE ADULT - PROBLEM SELECTOR PLAN 4
ROSA ISELAM 12/22   Would recommend to give Dulcolax supp now   Must monitor for opiate induced constipation

## 2018-12-27 NOTE — PROGRESS NOTE ADULT - PROBLEM SELECTOR PLAN 3
3 BT in 24 hrs ( 1 po oxy and 2 IV morphine BTs)  RUQ pain likely 2/2 tumor burden. Currently pain 8-9/10   C/w Morphine 2 mg IVP Q4H PRN  PO route diminishing C/w antibiotics at this time.

## 2018-12-27 NOTE — PROGRESS NOTE ADULT - PROBLEM SELECTOR PLAN 5
Patient is Full Code  Family meeting scheduled for today 2 pm.  Patient though more alert, continues to be intermittently confused and is losing PO route. Will c/w ongoing GOC discussions. LBM 12/22   In v/o disease process, consider laculose for bowel regimen  Must monitor for opiate induced constipation

## 2018-12-27 NOTE — PROGRESS NOTE ADULT - PROBLEM SELECTOR PLAN 4
ROSA ISELAM 12/22   Would recommend to give Dulcolax supp   Must monitor for opiate induced constipation 3 BT in 24 hrs ( 1 po oxy and 2 IV morphine BTs)  RUQ pain likely 2/2 tumor burden. Currently pain 8-9/10   C/w Morphine 2 mg IVP Q4H PRN  PO route diminishing

## 2018-12-27 NOTE — PROGRESS NOTE ADULT - MS EXT PE MLT D E PC
no cyanosis/no pedal edema
no pedal edema/no cyanosis
no cyanosis/no pedal edema
no cyanosis/pedal edema

## 2018-12-28 NOTE — PROGRESS NOTE ADULT - PROBLEM SELECTOR PLAN 2
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 Goals of care discussed by palliative team, patient is on full code for now.

## 2018-12-29 NOTE — PROGRESS NOTE ADULT - GIT ABD PE PAL DETAILS PC
distended
tender
tender
Normal rate, regular rhythm, normal S1, S2 heart sounds heard.
mild RUQ/tender
tender

## 2018-12-29 NOTE — PROVIDER CONTACT NOTE (CRITICAL VALUE NOTIFICATION) - BACKGROUND
admitted with breast CA with mets
Patient dx with Malignant neoplasm of the breast. PMH Breast cancer, HTN, among others.
63 F (ambulatory with assistance) PMHx HTN, stage IV breast Ca (liver, spine) (initially diagnosed 2015 at [facility], [place]) a/w SIRS
Patient admitted for malignant neoplasm of female breast
Pt is admitted with neoplasm of metastatic breast cancer
Pt admitted with malignant breast CA

## 2018-12-29 NOTE — PROVIDER CONTACT NOTE (CRITICAL VALUE NOTIFICATION) - RECOMMENDATIONS
ADS will be notified
redraw braeden robertsontick
Provider notified of result
ADS will be notified
Continue to monitor.
NP to assess patient.

## 2018-12-29 NOTE — PROVIDER CONTACT NOTE (CRITICAL VALUE NOTIFICATION) - PERSON GIVING RESULT:
Jesika Hernandez; Microbiology
[facility]
Valentín Lawrence
Vane Dumont
rzsourav grubbs
Jacquelin S

## 2018-12-29 NOTE — PROVIDER CONTACT NOTE (CRITICAL VALUE NOTIFICATION) - TEST AND RESULT REPORTED:
Blood culture: 1 of 2: Anerobic Gram (-) Rods
Lactate 11.0
Lactate 6.4
Lactate 8.0
glucose
Lactate 9.7

## 2018-12-29 NOTE — PROVIDER CONTACT NOTE (CRITICAL VALUE NOTIFICATION) - NAME OF MD/NP/PA/DO NOTIFIED:
Skylar Gail [id number]
ADS Chanell, [id number]
Cheyanne Rivers NP
Darby Velez, NP
Lucius Lazo ADS [id number]
Fer Monet [id number]

## 2018-12-29 NOTE — PROVIDER CONTACT NOTE (CRITICAL VALUE NOTIFICATION) - ASSESSMENT
Pt is not in any distress
lethargic but arousable. vss afberile
Patient is alert; VS stable
Patient asymptomatic.
Pt is alert and oriented x4, vital sign stable. no acute distress noted.
Pt is not in any distress

## 2018-12-29 NOTE — PROGRESS NOTE ADULT - NEGATIVE CARDIOVASCULAR SYMPTOMS
no chest pain/no palpitations
no chest pain/no palpitations
no chest pain
no palpitations/no chest pain

## 2018-12-29 NOTE — PROVIDER CONTACT NOTE (CRITICAL VALUE NOTIFICATION) - ACTION/TREATMENT ORDERED:
ADS Fer, [id number] made aware
redraw lab and do fingerstick
Provider noted.
ADS Chanell, [id number], made aware
No new order.
Will continue to monitor.

## 2018-12-29 NOTE — PROGRESS NOTE ADULT - NEGATIVE GASTROINTESTINAL SYMPTOMS
no vomiting/no nausea
no nausea/no vomiting
no nausea/no vomiting/no diarrhea/no constipation
no diarrhea/no nausea/no vomiting
no vomiting/no constipation/no diarrhea/no nausea
no vomiting/no nausea
no nausea/no vomiting/no constipation/no abdominal pain/no diarrhea

## 2018-12-29 NOTE — PROGRESS NOTE ADULT - NEGATIVE HEMATOLOGY SYMPTOMS
no gum bleeding/no nose bleeding
no nose bleeding/no gum bleeding
no nose bleeding/no gum bleeding
no gum bleeding/no nose bleeding

## 2018-12-29 NOTE — PROVIDER CONTACT NOTE (CRITICAL VALUE NOTIFICATION) - SITUATION
glucose 576
Blood culture: 1 of 2: Anerobic Gram (-) Rods
Critical lab value: lactate 11.0
Lactate 6.4
Patient has critical value, lactate 8.0
Critical lab result : Lactate 9.7

## 2018-12-31 NOTE — CHART NOTE - NSCHARTNOTEFT_GEN_A_CORE
Received a phone call from outpatient radiologist - Patient was discharged from [facility] on [address] and was never tapered and stopped abruptly - However in setting of active bacteremia and viremia likely not a candidate for steroids - Will have attending f/u in the AM

## 2019-01-01 VITALS
DIASTOLIC BLOOD PRESSURE: 40 MMHG | SYSTOLIC BLOOD PRESSURE: 80 MMHG | HEART RATE: 88 BPM | OXYGEN SATURATION: 99 % | RESPIRATION RATE: 24 BRPM

## 2019-01-01 LAB
ALBUMIN SERPL ELPH-MCNC: 1.8 G/DL — LOW (ref 3.3–5)
ALP SERPL-CCNC: 242 U/L — HIGH (ref 40–120)
ALT FLD-CCNC: 450 U/L — HIGH (ref 4–33)
AST SERPL-CCNC: 490 U/L — HIGH (ref 4–32)
BASE EXCESS BLDA CALC-SCNC: -17.2 MMOL/L — SIGNIFICANT CHANGE UP
BASOPHILS # BLD AUTO: 0.05 K/UL — SIGNIFICANT CHANGE UP (ref 0–0.2)
BASOPHILS NFR BLD AUTO: 0.5 % — SIGNIFICANT CHANGE UP (ref 0–2)
BILIRUB DIRECT SERPL-MCNC: 9.9 MG/DL — HIGH (ref 0.1–0.2)
BILIRUB SERPL-MCNC: 15.2 MG/DL — HIGH (ref 0.2–1.2)
BILIRUB SERPL-MCNC: 15.2 MG/DL — HIGH (ref 0.2–1.2)
BUN SERPL-MCNC: 56 MG/DL — HIGH (ref 7–23)
CALCIUM SERPL-MCNC: 9.7 MG/DL — SIGNIFICANT CHANGE UP (ref 8.4–10.5)
CHLORIDE SERPL-SCNC: 106 MMOL/L — SIGNIFICANT CHANGE UP (ref 98–107)
CO2 SERPL-SCNC: 7 MMOL/L — CRITICAL LOW (ref 22–31)
CREAT SERPL-MCNC: 1.21 MG/DL — SIGNIFICANT CHANGE UP (ref 0.5–1.3)
EOSINOPHIL # BLD AUTO: 0.05 K/UL — SIGNIFICANT CHANGE UP (ref 0–0.5)
EOSINOPHIL NFR BLD AUTO: 0.5 % — SIGNIFICANT CHANGE UP (ref 0–6)
GLUCOSE BLDA-MCNC: 125 MG/DL — HIGH (ref 70–99)
GLUCOSE SERPL-MCNC: 130 MG/DL — HIGH (ref 70–99)
HCO3 BLDA-SCNC: 12 MMOL/L — LOW (ref 22–26)
HCT VFR BLD CALC: 33.7 % — LOW (ref 34.5–45)
HCT VFR BLD CALC: 33.7 % — LOW (ref 34.5–45)
HCT VFR BLDA CALC: 33.3 % — LOW (ref 34.5–46.5)
HGB BLD-MCNC: 11.7 G/DL — SIGNIFICANT CHANGE UP (ref 11.5–15.5)
HGB BLD-MCNC: 11.7 G/DL — SIGNIFICANT CHANGE UP (ref 11.5–15.5)
HGB BLDA-MCNC: 10.8 G/DL — LOW (ref 11.5–15.5)
IMM GRANULOCYTES # BLD AUTO: 0.25 # — SIGNIFICANT CHANGE UP
IMM GRANULOCYTES NFR BLD AUTO: 2.5 % — HIGH (ref 0–1.5)
LYMPHOCYTES # BLD AUTO: 1.25 K/UL — SIGNIFICANT CHANGE UP (ref 1–3.3)
LYMPHOCYTES # BLD AUTO: 12.7 % — LOW (ref 13–44)
MAGNESIUM SERPL-MCNC: 2.5 MG/DL — SIGNIFICANT CHANGE UP (ref 1.6–2.6)
MANUAL SMEAR VERIFICATION: SIGNIFICANT CHANGE UP
MANUAL SMEAR VERIFICATION: SIGNIFICANT CHANGE UP
MCHC RBC-ENTMCNC: 27 PG — SIGNIFICANT CHANGE UP (ref 27–34)
MCHC RBC-ENTMCNC: 27 PG — SIGNIFICANT CHANGE UP (ref 27–34)
MCHC RBC-ENTMCNC: 34.7 % — SIGNIFICANT CHANGE UP (ref 32–36)
MCHC RBC-ENTMCNC: 34.7 % — SIGNIFICANT CHANGE UP (ref 32–36)
MCV RBC AUTO: 77.8 FL — LOW (ref 80–100)
MCV RBC AUTO: 77.8 FL — LOW (ref 80–100)
MONOCYTES # BLD AUTO: 0.48 K/UL — SIGNIFICANT CHANGE UP (ref 0–0.9)
MONOCYTES NFR BLD AUTO: 4.9 % — SIGNIFICANT CHANGE UP (ref 2–14)
NEUTROPHILS # BLD AUTO: 7.78 K/UL — HIGH (ref 1.8–7.4)
NEUTROPHILS NFR BLD AUTO: 78.9 % — HIGH (ref 43–77)
NRBC # FLD: 6.44 — SIGNIFICANT CHANGE UP
NRBC # FLD: 6.44 — SIGNIFICANT CHANGE UP
NRBC FLD-RTO: 65.3 — SIGNIFICANT CHANGE UP
NRBC FLD-RTO: 65.3 — SIGNIFICANT CHANGE UP
PCO2 BLDA: 20 MMHG — LOW (ref 32–48)
PH BLDA: 7.26 PH — LOW (ref 7.35–7.45)
PHOSPHATE SERPL-MCNC: 4 MG/DL — SIGNIFICANT CHANGE UP (ref 2.5–4.5)
PLATELET # BLD AUTO: 33 K/UL — LOW (ref 150–400)
PLATELET # BLD AUTO: 33 K/UL — LOW (ref 150–400)
PMV BLD: SIGNIFICANT CHANGE UP FL (ref 7–13)
PMV BLD: SIGNIFICANT CHANGE UP FL (ref 7–13)
PO2 BLDA: 160 MMHG — HIGH (ref 83–108)
POTASSIUM BLDA-SCNC: 3.6 MMOL/L — SIGNIFICANT CHANGE UP (ref 3.4–4.5)
POTASSIUM SERPL-MCNC: 5.8 MMOL/L — HIGH (ref 3.5–5.3)
POTASSIUM SERPL-SCNC: 5.8 MMOL/L — HIGH (ref 3.5–5.3)
PROT SERPL-MCNC: 4.8 G/DL — LOW (ref 6–8.3)
RBC # BLD: 4.33 M/UL — SIGNIFICANT CHANGE UP (ref 3.8–5.2)
RBC # BLD: 4.33 M/UL — SIGNIFICANT CHANGE UP (ref 3.8–5.2)
RBC # FLD: 27.8 % — HIGH (ref 10.3–14.5)
RBC # FLD: 27.8 % — HIGH (ref 10.3–14.5)
SAO2 % BLDA: 98.8 % — SIGNIFICANT CHANGE UP (ref 95–99)
SODIUM BLDA-SCNC: 138 MMOL/L — SIGNIFICANT CHANGE UP (ref 136–146)
SODIUM SERPL-SCNC: 141 MMOL/L — SIGNIFICANT CHANGE UP (ref 135–145)
WBC # BLD: 9.86 K/UL — SIGNIFICANT CHANGE UP (ref 3.8–10.5)
WBC # BLD: 9.86 K/UL — SIGNIFICANT CHANGE UP (ref 3.8–10.5)
WBC # FLD AUTO: 9.86 K/UL — SIGNIFICANT CHANGE UP (ref 3.8–10.5)
WBC # FLD AUTO: 9.86 K/UL — SIGNIFICANT CHANGE UP (ref 3.8–10.5)

## 2019-01-01 PROCEDURE — 99223 1ST HOSP IP/OBS HIGH 75: CPT | Mod: GC

## 2019-01-01 RX ORDER — ROBINUL 0.2 MG/ML
0.4 INJECTION INTRAMUSCULAR; INTRAVENOUS EVERY 6 HOURS
Qty: 0 | Refills: 0 | Status: DISCONTINUED | OUTPATIENT
Start: 2019-01-01 | End: 2019-01-01

## 2019-01-01 RX ORDER — MORPHINE SULFATE 50 MG/1
1 CAPSULE, EXTENDED RELEASE ORAL
Qty: 0 | Refills: 0 | Status: DISCONTINUED | OUTPATIENT
Start: 2019-01-01 | End: 2019-01-01

## 2019-01-01 RX ADMIN — MORPHINE SULFATE 1 MILLIGRAM(S): 50 CAPSULE, EXTENDED RELEASE ORAL at 08:32

## 2019-01-01 RX ADMIN — MORPHINE SULFATE 1 MILLIGRAM(S): 50 CAPSULE, EXTENDED RELEASE ORAL at 16:20

## 2019-01-01 RX ADMIN — Medication 500000 UNIT(S): at 05:07

## 2019-01-01 RX ADMIN — Medication 3 MILLILITER(S): at 04:22

## 2019-01-01 RX ADMIN — Medication 3 MILLILITER(S): at 22:37

## 2019-01-01 RX ADMIN — Medication 3 MILLILITER(S): at 04:11

## 2019-01-01 RX ADMIN — MORPHINE SULFATE 1 MILLIGRAM(S): 50 CAPSULE, EXTENDED RELEASE ORAL at 16:15

## 2019-01-01 RX ADMIN — GANCICLOVIR SODIUM 100 MILLIGRAM(S): 50 INJECTION, POWDER, LYOPHILIZED, FOR SOLUTION INTRAVENTRICULAR at 05:06

## 2019-01-01 RX ADMIN — SODIUM CHLORIDE 75 MILLILITER(S): 9 INJECTION, SOLUTION INTRAVENOUS at 04:37

## 2019-01-01 RX ADMIN — Medication 3 MILLILITER(S): at 10:00

## 2019-01-01 RX ADMIN — MORPHINE SULFATE 1 MILLIGRAM(S): 50 CAPSULE, EXTENDED RELEASE ORAL at 02:16

## 2019-01-01 RX ADMIN — PIPERACILLIN AND TAZOBACTAM 25 GRAM(S): 4; .5 INJECTION, POWDER, LYOPHILIZED, FOR SOLUTION INTRAVENOUS at 15:09

## 2019-01-01 RX ADMIN — Medication 3 MILLILITER(S): at 15:21

## 2019-01-01 RX ADMIN — MORPHINE SULFATE 1 MILLIGRAM(S): 50 CAPSULE, EXTENDED RELEASE ORAL at 01:46

## 2019-01-01 RX ADMIN — GANCICLOVIR SODIUM 100 MILLIGRAM(S): 50 INJECTION, POWDER, LYOPHILIZED, FOR SOLUTION INTRAVENTRICULAR at 18:27

## 2019-01-01 RX ADMIN — MORPHINE SULFATE 1 MILLIGRAM(S): 50 CAPSULE, EXTENDED RELEASE ORAL at 23:57

## 2019-01-01 RX ADMIN — MORPHINE SULFATE 1 MILLIGRAM(S): 50 CAPSULE, EXTENDED RELEASE ORAL at 23:27

## 2019-01-01 RX ADMIN — MORPHINE SULFATE 1 MILLIGRAM(S): 50 CAPSULE, EXTENDED RELEASE ORAL at 08:45

## 2019-01-01 RX ADMIN — ROBINUL 0.4 MILLIGRAM(S): 0.2 INJECTION INTRAMUSCULAR; INTRAVENOUS at 03:06

## 2019-01-01 RX ADMIN — PIPERACILLIN AND TAZOBACTAM 25 GRAM(S): 4; .5 INJECTION, POWDER, LYOPHILIZED, FOR SOLUTION INTRAVENOUS at 05:07

## 2019-01-01 RX ADMIN — PIPERACILLIN AND TAZOBACTAM 25 GRAM(S): 4; .5 INJECTION, POWDER, LYOPHILIZED, FOR SOLUTION INTRAVENOUS at 23:27

## 2019-01-01 NOTE — PROGRESS NOTE ADULT - ASSESSMENT
62yo Female, ambulatory with assistance, with h/o HTN and stage IV metastatic breast ca (liver, spine) Dx'd initially in 2015 at [facility], [place]; The pt c/o worsening fatigue and generalized weakness. States that the symptoms have been worsening for past month, most recently to the degree that she would need assistance to ambulate. The pt also reports associated nausea and occasional vomiting and decreased oral intake for past few days. States that completed radiotherapy to her head today but feels no HA or confusion. Has chronic epigastric, RUQ pain which occurs sometime but not now. Pt requesting to transfer her oncological care to Dr. Eng. Otherwise she reports no fever, chills, cough, CP, SOB, Abd pain, diarrhea or urinary symptoms. Cannot recall her 2 BP medications.    ED course: 3L LR bolus; (21 Dec 2018 04:17)    ER vitals:  T 97.7, P 101, /79.  WBC 20.7 (80% N).  LFTs and TB elevated.  Lact 13.  UA (-)nit/small LE, with 6-10 WBC.  Cxr s/o metastatic disease.  Bcx PCR (+) E.coli.  ID consult called for further abx managment.    Pt c/o lower abd pain both quandrants.      Recommend:    - E.coli bacteremia -  Urinary cultures growing a different organism (klebsiella), suspect bacteremia from GI source.  CTap with hepatic mets.  Bacteremia possibly from translocation of bacteria into portal circulation.  Repeat blood cultures NGTD.      - f/u blood cultures and sensitivities.   Kleb that is growing from Urine is sens to zosyn.  Pt currently denies urinary symptoms.      - Cont zosyn for gram negative coverage    - LFTs and bilirubin elevated.  f/u CTap.  Trend LFTs (rising), acute hepatitis panel (neg).  Hepatic and pulmonary mets seen on CT c/a/p    - Pt with oral thrush - cont nystatin swish/swallow.  Thrush is improving    - Pt with metastatic breast cancer including liver and possibly brain.  Pt not a candidate for chemo at this time.  Palliative is following, awaiting Family meeting to discuss continued GOC    - CMV pcr is positive.  Pt started on ganciclovir on 12/25.  Cont to monitor for myelosuppression and renal toxicity.  Pt with worsening thrombocytopenia, likely multifactorial.  Onc f/u appreciated.        Pt with poor prognosis.    Shelly Phillips  [phone]
62yo Female, ambulatory with assistance, with h/o HTN and stage IV metastatic breast ca (liver, spine) Dx'd initially in 2015 at [facility], [place]; The pt c/o worsening fatigue and generalized weakness. States that the symptoms have been worsening for past month, most recently to the degree that she would need assistance to ambulate. The pt also reports associated nausea and occasional vomiting and decreased oral intake for past few days. States that completed radiotherapy to her head today but feels no HA or confusion. Has chronic epigastric, RUQ pain which occurs sometime but not now. Pt requesting to transfer her oncological care to Dr. Eng. Otherwise she reports no fever, chills, cough, CP, SOB, Abd pain, diarrhea or urinary symptoms. Cannot recall her 2 BP medications.    ED course: 3L LR bolus; (21 Dec 2018 04:17)    ER vitals:  T 97.7, P 101, /79.  WBC 20.7 (80% N).  LFTs and TB elevated.  Lact 13.  UA (-)nit/small LE, with 6-10 WBC.  Cxr s/o metastatic disease.  Bcx PCR (+) E.coli.  ID consult called for further abx managment.    Pt c/o lower abd pain both quandrants.      Recommend:    - E.coli bacteremia -  Urinary cultures growing a different organism (klebsiella), suspect GI source.  CTap with hepatic mets, possible from translocation of bacteria into portal circulation.  Repeat blood cultures NGTD.      - f/u blood cultures and sensitivities.  aslo f/u sensis of Kleb that is growing from Urine.  Pt currently denies urinary symptoms.      - Cont zosyn for gram negative coverage    - LFTs and bilirubin elevated.  f/u CTap.  Trend LFTs, acute hepatitis panel.  Hepatic and pulmonary mets seen on CT c/a/p    - Pt with oral thrush - cont nystatin swish/swallow.   Monitor for clinical improvement.          Will follow,    Shelly Phillips  [phone]
62yo Female, ambulatory with assistance, with h/o HTN and stage IV metastatic breast ca (liver, spine) Dx'd initially in 2015 at [facility], [place] a/w SIRS, generalized weakness and elevated LFTs with bilirubin likely due to metastatic disease, r/o sepsis; Found to have extensive oral candidiasis;
64yo Female, ambulatory with assistance, with h/o HTN and stage IV metastatic breast ca (liver, spine) Dx'd initially in 2015 at [facility], [place]; The pt c/o worsening fatigue and generalized weakness. States that the symptoms have been worsening for past month, most recently to the degree that she would need assistance to ambulate. The pt also reports associated nausea and occasional vomiting and decreased oral intake for past few days. States that completed radiotherapy to her head today but feels no HA or confusion. Has chronic epigastric, RUQ pain which occurs sometime but not now. Pt requesting to transfer her oncological care to Dr. Eng. Otherwise she reports no fever, chills, cough, CP, SOB, Abd pain, diarrhea or urinary symptoms. Cannot recall her 2 BP medications.    ED course: 3L LR bolus; (21 Dec 2018 04:17)    ER vitals:  T 97.7, P 101, /79.  WBC 20.7 (80% N).  LFTs and TB elevated.  Lact 13.  UA (-)nit/small LE, with 6-10 WBC.  Cxr s/o metastatic disease.  Bcx PCR (+) E.coli.  ID consult called for further abx managment.    Pt c/o lower abd pain both quandrants.      Recommend:    - E.coli bacteremia -  Urinary cultures growing a different organism (klebsiella), suspect bacteremia from GI source.  CTap with hepatic mets.  Bacteremia possibly from translocation of bacteria into portal circulation.  Repeat blood cultures NGTD.      - f/u blood cultures and sensitivities.   Kleb that is growing from Urine is sens to zosyn.        - Cont zosyn for gram negative coverage, complete 2 week course through 1/3    - LFTs and bilirubin elevated.  f/u CTap.  Trend LFTs (rising), acute hepatitis panel (neg).  Hepatic and pulmonary mets seen on CT c/a/p.  Pt with hepatic encephalopathy, ammonia level is 63, started on lactulose    - Pt with oral thrush - cont nystatin swish/swallow.      - Pt with metastatic breast cancer including liver and possibly brain.  Pt not a candidate for chemo at this time.  Palliative is following, awaiting Family meeting to discuss continued GOC.  Pt is full code.  Ongoing discussion in progress    - CMV pcr is positive.  Pt started on ganciclovir on 12/25.  Cont to monitor for myelosuppression and renal toxicity.  Pt with worsening thrombocytopenia, likely multifactorial.  Onc f/u appreciated.      * No new ID recs at this time      Pt with poor prognosis.    Shelly Phillips  [phone]
64yo Female, ambulatory with assistance, with h/o HTN and stage IV metastatic breast ca (liver, spine) Dx'd initially in 2015 at [facility], [place]; The pt c/o worsening fatigue and generalized weakness. States that the symptoms have been worsening for past month, most recently to the degree that she would need assistance to ambulate. The pt also reports associated nausea and occasional vomiting and decreased oral intake for past few days. States that completed radiotherapy to her head today but feels no HA or confusion. Has chronic epigastric, RUQ pain which occurs sometime but not now. Pt requesting to transfer her oncological care to Dr. Eng. Otherwise she reports no fever, chills, cough, CP, SOB, Abd pain, diarrhea or urinary symptoms. Cannot recall her 2 BP medications.    ED course: 3L LR bolus; (21 Dec 2018 04:17)    ER vitals:  T 97.7, P 101, /79.  WBC 20.7 (80% N).  LFTs and TB elevated.  Lact 13.  UA (-)nit/small LE, with 6-10 WBC.  Cxr s/o metastatic disease.  Bcx PCR (+) E.coli.  ID consult called for further abx managment.    Pt c/o lower abd pain both quandrants.      Recommend:    - E.coli bacteremia -  Urinary cultures growing a different organism (klebsiella), suspect bacteremia from GI source.  CTap with hepatic mets.  Bacteremia possibly from translocation of bacteria into portal circulation.  Repeat blood cultures NGTD.      - f/u blood cultures and sensitivities.   Kleb that is growing from Urine is sens to zosyn.  Pt currently denies urinary symptoms.      - Cont zosyn for gram negative coverage    - LFTs and bilirubin elevated.  f/u CTap.  Trend LFTs (rising), acute hepatitis panel (neg).  Hepatic and pulmonary mets seen on CT c/a/p    - Pt with oral thrush - cont nystatin swish/swallow.  Thrush is improving    - Pt with metastatic breast cancer including liver and possibly brain.  Pt not a candidate for chemo at this time.  Palliative is following.     - CMV pcr is positive.  Pt started on ganciclovir on 12/25.  Cont to monitor for myelosuppression and renal toxicity.  Pt with worsening thrombocytopenia, likely multifactorial.  Onc f/u appreciated.        Pt with poor prognosis.    Shelly Phillips  [phone]
62yo Female with h/o HTN and stage IV metastatic breast ca (liver, spine) Dx'd initially in 2015 at [facility], [place] presented with worsening fatigue and generalized weakness, later found to have elevated LFTs with bilirubin likely due to metastatic disease, extensive oral candidiasis, and septicemia. Palliative consulted for GOC.
62yo Female, ambulatory with assistance, with h/o HTN and stage IV metastatic breast ca (liver, spine) Dx'd initially in 2015 at [facility], [place] a/w SIRS, generalized weakness and elevated LFTs with bilirubin likely due to metastatic disease, r/o sepsis; Found to have extensive oral candidiasis;
62yo Female, ambulatory with assistance, with h/o HTN and stage IV metastatic breast ca (liver, spine) Dx'd initially in 2015 at [facility], [place]; The pt c/o worsening fatigue and generalized weakness. States that the symptoms have been worsening for past month, most recently to the degree that she would need assistance to ambulate. The pt also reports associated nausea and occasional vomiting and decreased oral intake for past few days. States that completed radiotherapy to her head today but feels no HA or confusion. Has chronic epigastric, RUQ pain which occurs sometime but not now. Pt requesting to transfer her oncological care to Dr. Eng. Otherwise she reports no fever, chills, cough, CP, SOB, Abd pain, diarrhea or urinary symptoms. Cannot recall her 2 BP medications.    ED course: 3L LR bolus; (21 Dec 2018 04:17)    ER vitals:  T 97.7, P 101, /79.  WBC 20.7 (80% N).  LFTs and TB elevated.  Lact 13.  UA (-)nit/small LE, with 6-10 WBC.  Cxr s/o metastatic disease.  Bcx PCR (+) E.coli.  ID consult called for further abx managment.    Pt c/o lower abd pain both quandrants.      Recommend:    - E.coli bacteremia -  Urinary cultures growing a different organism (klebsiella), suspect bacteremia from GI source.  CTap with hepatic mets.  Bacteremia possibly from translocation of bacteria into portal circulation.  Repeat blood cultures NGTD.      - f/u blood cultures and sensitivities.   Kleb that is growing from Urine is sens to zosyn.        - Cont zosyn for gram negative coverage, complete 2 week course through 1/3    - LFTs and bilirubin elevated.  f/u CTap.  Trend LFTs (rising), acute hepatitis panel (neg).  Hepatic and pulmonary mets seen on CT c/a/p.  Pt with hepatic encephalopathy, ammonia level is 63, started on lactulose    - Pt with oral thrush - cont nystatin swish/swallow.      - Pt with metastatic breast cancer including liver and possibly brain.  Pt not a candidate for chemo at this time.  Palliative is following, awaiting Family meeting to discuss continued GOC.  Pt is full code.  Ongoing discussion in progress.  Daughter is still hopeful that mom will get better.    - CMV pcr is positive.  Pt started on ganciclovir on 12/25.  Cont to monitor for myelosuppression and renal toxicity.  Pt with worsening thrombocytopenia, likely multifactorial.      * No new ID recs at this time.  Cont present zosyn and ganciclovir.      Pt with poor prognosis.    Shelly Phillips  [phone]
62yo Female, ambulatory with assistance, with h/o HTN and stage IV metastatic breast ca (liver, spine) Dx'd initially in 2015 at [facility], [place] a/w SIRS, generalized weakness and elevated LFTs with bilirubin likely due to metastatic disease, r/o sepsis; Found to have extensive oral candidiasis;
62yo Female, ambulatory with assistance, with h/o HTN and stage IV metastatic breast ca (liver, spine) Dx'd initially in 2015 at [facility], [place] a/w SIRS, generalized weakness and elevated LFTs with bilirubin likely due to metastatic disease, r/o sepsis; Found to have extensive oral candidiasis;
62yo Female, ambulatory with assistance, with h/o HTN and stage IV metastatic breast ca (liver, spine) Dx'd initially in 2015 at [facility], [place] a/w SIRS, generalized weakness and elevated LFTs with bilirubin likely due to metastatic disease, r/o sepsis; Found to have extensive oral candidiasis;
64yo Female with h/o HTN and stage IV metastatic breast ca (liver, spine, leptomeningeal spread) Dx'd initially in 2015 at [facility], [place] presented with worsening fatigue and generalized weakness, later found to have elevated LFTs with bilirubin likely due to metastatic disease, extensive oral candidiasis, and septicemia. Palliative consulted for GOC.
64yo Female with h/o HTN and stage IV metastatic breast ca (liver, spine, leptomeningeal spread) Dx'd initially in 2015 at [facility], [place] presented with worsening fatigue and generalized weakness, later found to have elevated LFTs with bilirubin likely due to metastatic disease, extensive oral candidiasis, and septicemia. Palliative consulted for GOC.
64yo Female, ambulatory with assistance, with h/o HTN and stage IV metastatic breast ca (liver, spine) Dx'd initially in 2015 at [facility], [place]; The pt c/o worsening fatigue and generalized weakness. States that the symptoms have been worsening for past month, most recently to the degree that she would need assistance to ambulate. The pt also reports associated nausea and occasional vomiting and decreased oral intake for past few days. States that completed radiotherapy to her head today but feels no HA or confusion. Has chronic epigastric, RUQ pain which occurs sometime but not now. Pt requesting to transfer her oncological care to Dr. Eng. Otherwise she reports no fever, chills, cough, CP, SOB, Abd pain, diarrhea or urinary symptoms. Cannot recall her 2 BP medications.    ED course: 3L LR bolus; (21 Dec 2018 04:17)    ER vitals:  T 97.7, P 101, /79.  WBC 20.7 (80% N).  LFTs and TB elevated.  Lact 13.  UA (-)nit/small LE, with 6-10 WBC.  Cxr s/o metastatic disease.  Bcx PCR (+) E.coli.  ID consult called for further abx managment.    Pt c/o lower abd pain both quandrants.      Recommend:    - E.coli bacteremia -  Urinary cultures growing a different organism (klebsiella), suspect bacteremia from GI source.  CTap with hepatic mets.  Bacteremia possibly from translocation of bacteria into portal circulation.  Repeat blood cultures NGTD.      - f/u blood cultures and sensitivities.   Kleb that is growing from Urine is sens to zosyn.  Pt currently denies urinary symptoms.      - Cont zosyn for gram negative coverage    - LFTs and bilirubin elevated.  f/u CTap.  Trend LFTs (rising), acute hepatitis panel (neg).  Hepatic and pulmonary mets seen on CT c/a/p    - Pt with oral thrush - cont nystatin swish/swallow.  Thrush is improving    - Pt with metastatic breast cancer including liver and possibly brain.  Pt not a candidate for chemo at this time.  Palliative is following.         Will follow,    Shelly Phillips  [phone]
64yo Female, ambulatory with assistance, with h/o HTN and stage IV metastatic breast ca (liver, spine) Dx'd initially in 2015 at [facility], [place] a/w SIRS, generalized weakness and elevated LFTs with bilirubin likely due to metastatic disease, r/o sepsis; Found to have extensive oral candidiasis;
64yo Female, ambulatory with assistance, with h/o HTN and stage IV metastatic breast ca (liver, spine) Dx'd initially in 2015 at [facility], [place] a/w SIRS, generalized weakness and elevated LFTs with bilirubin likely due to metastatic disease, r/o sepsis; Found to have extensive oral candidiasis;
64yo Female, ambulatory with assistance, with h/o HTN and stage IV metastatic breast ca (liver, spine) Dx'd initially in 2015 at [facility], [place] a/w SIRS, generalized weakness and elevated LFTs with bilirubin likely due to metastatic disease, r/o sepsis; Found to have extensive oral candidiasis;
64yo Female, ambulatory with assistance, with h/o HTN and stage IV metastatic breast ca (liver, spine) Dx'd initially in 2015 at [facility], [place]; The pt c/o worsening fatigue and generalized weakness. States that the symptoms have been worsening for past month, most recently to the degree that she would need assistance to ambulate. The pt also reports associated nausea and occasional vomiting and decreased oral intake for past few days. States that completed radiotherapy to her head today but feels no HA or confusion. Has chronic epigastric, RUQ pain which occurs sometime but not now. Pt requesting to transfer her oncological care to Dr. Eng. Otherwise she reports no fever, chills, cough, CP, SOB, Abd pain, diarrhea or urinary symptoms. Cannot recall her 2 BP medications.    ED course: 3L LR bolus; (21 Dec 2018 04:17)    ER vitals:  T 97.7, P 101, /79.  WBC 20.7 (80% N).  LFTs and TB elevated.  Lact 13.  UA (-)nit/small LE, with 6-10 WBC.  Cxr s/o metastatic disease.  Bcx PCR (+) E.coli.  ID consult called for further abx managment.    Pt c/o lower abd pain both quandrants.      Recommend:    - E.coli bacteremia -  Urinary cultures growing a different organism (klebsiella), suspect bacteremia from GI source.  CTap with hepatic mets.  Bacteremia possibly from translocation of bacteria into portal circulation.  Repeat blood cultures NGTD.      - f/u blood cultures and sensitivities.   Kleb that is growing from Urine is sens to zosyn.        - Cont zosyn for gram negative coverage    - LFTs and bilirubin elevated.  f/u CTap.  Trend LFTs (rising), acute hepatitis panel (neg).  Hepatic and pulmonary mets seen on CT c/a/p.  Pt with hepatic encephalopathy, ammonia level is 63, started on lactulose    - Pt with oral thrush - cont nystatin swish/swallow.  Thrush is improving    - Pt with metastatic breast cancer including liver and possibly brain.  Pt not a candidate for chemo at this time.  Palliative is following, awaiting Family meeting to discuss continued GOC.  Pt is full code.  Ongoing discussion in progress    - CMV pcr is positive.  Pt started on ganciclovir on 12/25.  Cont to monitor for myelosuppression and renal toxicity.  Pt with worsening thrombocytopenia, likely multifactorial.  Onc f/u appreciated.        Pt with poor prognosis.    Shelly Phillips  [phone]
63F with currently MBC (details pending), s/p brain RT at [facility] recently, here with nausea, vomiting and dehydration along with other sequelae, feeling a little better. Pt is bacteremic and jaundiced and liver insufficiency. Will recommend:  - continue abx as per ID and adjust those as per final c/s results  - ondansetron 4 mg q6h and reglan 10 mg q6h as needed  - mouth care  - on DVT prophylaxis  - obtain recent radiology results or CDs from [facility] - the email received has a note but no radiology or pathology or other needed info  - PT  - all other supportive Rx  - monitor CBC, LFTs  - pt is not a candidate for any chemo at this time
63F with currently MBC (details pending), s/p brain RT at [facility] recently, here with nausea, vomiting and dehydration along with other sequelae, feeling a little better. Pt is bacteremic and jaundiced and liver insufficiency. Also has CMV inf.  On 12.27.18, discussed the diagnosis, poor prognosis and limited Rx options in great detail with the family yesterday and answered all of their dozens of questions. Pt has not improved much with current Rx and is not a candidate for chemo.  Will recommend:    - despite repeated attempts, unable to obtain pathology from prior hospital - will not be helpful at this time given the fact that pt is not a candidate for chemo  - continue abx, ganciclovir as per ID   - monitor CBC daily  - worsening TCP is likely from infection +/- abx and other meds, pt has no clinical bleeding  - transfuse plts only if plts < 10 or if pt is bleeding significantly from any source  - ondansetron 4 mg q6h and reglan 10 mg q6h as needed  - mouth care  - Off of lovenox  - all other supportive Rx  - monitor LFTs  - pt is not a candidate for any chemo at this time, the risks of chemo much higher than the benefits  - will recommend comfort care and hospice at this time.  - overall prognosis is guarded to poor  - please call as needed, will be available to talk to the family at any time      [phone]
63F with currently MBC with an extremely poor outcome at this time, s/p brain RT at [facility] recently, Pt is bacteremic and jaundiced and liver insufficiency. Also has CMV inf.  On 12.27.18, discussed the diagnosis, poor prognosis and limited Rx options in great detail with the family yesterday and answered all of their dozens of questions. Pt has not improved much with current Rx and is not a candidate for chemo.  today again,. I discussed the poor prognosis and futility of resuscitation and intubation in her case in detail with her son on the phone and requested the family to make a decision asap so that pt can have less suffering. Will recommend:    - await family's decision on DNR, DNI and comfort care - they will come to the hospital soon  - MICU has been called, futility of such interventions discussed with the son today  - continue abx, ganciclovir as per ID   - monitor CBC daily  - worsening TCP is likely from infection +/- abx and other meds, pt has no clinical bleeding  - transfuse plts only if plts < 10 or if pt is bleeding significantly from any source  - ondansetron 4 mg q6h and reglan 10 mg q6h as needed  - mouth care  - Off of lovenox  - all other supportive Rx  - monitor LFTs  - pt is not a candidate for any chemo at this time, the risks of chemo much higher than the benefits  - will recommend comfort care and hospice at this time.  - overall prognosis is extremely poor  - discussed with NP in detail  - please call as needed, will be available to talk to the family at any time      [phone]
64yo Female, ambulatory with assistance, with h/o HTN and stage IV metastatic breast ca (liver, spine) Dx'd initially in 2015 at [facility], [place] a/w SIRS, generalized weakness and elevated LFTs with bilirubin likely due to metastatic disease, r/o sepsis; Found to have extensive oral candidiasis;
62yo Female, ambulatory with assistance, with h/o HTN and stage IV metastatic breast ca (liver, spine) Dx'd initially in 2015 at [facility], [place] a/w SIRS, generalized weakness and elevated LFTs with bilirubin likely due to metastatic disease, r/o sepsis; Found to have extensive oral candidiasis;
64yo Female, ambulatory with assistance, with h/o HTN and stage IV metastatic breast ca (liver, spine) Dx'd initially in 2015 at [facility], [place] a/w SIRS, generalized weakness and elevated LFTs with bilirubin likely due to metastatic disease, r/o sepsis; Found to have extensive oral candidiasis;
(0) No abnormality
63F with currently MBC (details pending), s/p brain RT at [facility] recently, here with nausea, vomiting and dehydration along with other sequelae, feeling a little better. Pt is bacteremic and jaundiced and liver insufficiency. Also has CMV inf.  Discussed the diagnosis, poor prognosis and limited Rx options in great detail with the family yesterday and answered all of their dozens of questions. Pt has not improved much with current Rx and is not a candidate for chemo.  Will recommend:    - despite repeated attempts, unable to obtain pathology from prior hospital - will not be helpful at this time given the fact that pt is not a candidate for chemo  - continue abx, ganciclovir as per ID   - monitor CBC daily  - worsening TCP is likely from infection +/- abx and other meds  - transfuse plts only if plts < 10 or if pt is bleeding significantly from any source  - ondansetron 4 mg q6h and reglan 10 mg q6h as needed  - mouth care  - on DVT prophylaxis  - all other supportive Rx  - monitor LFTs  - pt is not a candidate for any chemo at this time, the risks of chemo much higher than the benefits  - will recommend comfort care and hospice at this time.  - overall prognosis is guarded to poor  - please call as needed, will be available to talk to the family at any time      [phone]
63F with currently MBC (details pending), s/p brain RT at [facility] recently, here with nausea, vomiting and dehydration along with other sequelae, feeling a little better. Pt is bacteremic and jaundiced and liver insufficiency. 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 Will recommend:  - continue abx as per ID and adjust those as per final c/s results, Dr Phillips is going to review the labs and decide about antiviral coverage  - worsening TCP is likely from infection +/- abx and other meds  - transfuse plts only if plts < 10 or if pt is bleeding significantly from any source  - ondansetron 4 mg q6h and reglan 10 mg q6h as needed  - mouth care  - on DVT prophylaxis  - all other supportive Rx  - monitor CBC, LFTs  - pt is not a candidate for any chemo at this time, the risks of chemo much higher than the benefits  - pt has to improve very very significantly before chemo can be considered.  - called the pt's son at the number from the chart. [phone],  no answer and no answering machine.  - overall prognosis is guarded to poor  - palliative care evaluation ongoing  - please call as needed, will be available to talk to the family at any time      [phone]
63F with currently MBC (details pending), s/p brain RT at [facility] recently, here with nausea, vomiting and dehydration along with other sequelae, feeling a little better. Pt is bacteremic and jaundiced and liver insufficiency. Also has CMV inf. Will recommend:  - continue abx, ganciclovir as per ID   - monitor CBC daily  - worsening TCP is likely from infection +/- abx and other meds  - transfuse plts only if plts < 10 or if pt is bleeding significantly from any source  - ondansetron 4 mg q6h and reglan 10 mg q6h as needed  - mouth care  - on DVT prophylaxis  - all other supportive Rx  - monitor LFTs  - pt is not a candidate for any chemo at this time, the risks of chemo much higher than the benefits  - pt has to improve very very significantly before chemo can be considered.  - overall prognosis is guarded to poor  - palliative care evaluation ongoing  - please call as needed, will be available to talk to the family at any time      [phone]
63F with currently MBC (details pending), s/p brain RT at [facility] recently, here with nausea, vomiting and dehydration along with other sequelae, feeling a little better. Pt is bacteremic and jaundiced and liver insufficiency. Will recommend:  - continue abx as per ID and adjust those as per final c/s results  - ondansetron 4 mg q6h and reglan 10 mg q6h as needed  - mouth care  - on DVT prophylaxis  - PT, when possible  - all other supportive Rx  - monitor CBC, LFTs  - pt is not a candidate for any chemo at this time  - discusses the results, the poor prognosis and limited management options for her stage 4 cancer and the role of palliative and comfort care with her son on the phone and all questions answered.  - if continues to deteriorate, please call palliative care evaluation
62yo Female, ambulatory with assistance, with h/o HTN and stage IV metastatic breast ca (liver, spine) Dx'd initially in 2015 at [facility], [place] a/w SIRS, generalized weakness and elevated LFTs with bilirubin likely due to metastatic disease, r/o sepsis; Found to have extensive oral candidiasis;
63F with currently MBC (details pending), s/p brain RT at [facility] recently, here with nausea, vomiting and dehydration along with other sequelae, feeling a little better. Pt is bacteremic and jaundiced and liver insufficiency. Also has CMV inf. Will recommend:  - despite repeated attempts, unable to obtain pathology from prior hospital  - continue abx, ganciclovir as per ID   - monitor CBC daily  - worsening TCP is likely from infection +/- abx and other meds  - transfuse plts only if plts < 10 or if pt is bleeding significantly from any source  - ondansetron 4 mg q6h and reglan 10 mg q6h as needed  - mouth care  - on DVT prophylaxis  - all other supportive Rx  - monitor LFTs  - pt is not a candidate for any chemo at this time, the risks of chemo much higher than the benefits  - pt has to improve very very significantly before chemo can be considered.  - overall prognosis is guarded to poor  - palliative care evaluation - family meeting likely today  - please call as needed, will be available to talk to the family at any time      [phone]

## 2019-01-01 NOTE — PROGRESS NOTE ADULT - PROBLEM SELECTOR PLAN 6
Start Senna and Miralax
ACP Time: 25 minutes spent   Please refer to GOC note.
Start Senna and Miralax
Patient is Full Code  Family meeting scheduled for today 2 pm.  Patient though more alert, continues to be intermittently confused and is losing PO route. Will c/w ongoing GOC discussions.

## 2019-01-01 NOTE — CHART NOTE - NSCHARTNOTEFT_GEN_A_CORE
Called by RN for pt being tachypnic- Vital Signs Last 24 Hrs  T(C): 36.6 (01 Jan 2019 08:17), Max: 36.6 (01 Jan 2019 08:17)  T(F): 97.8 (01 Jan 2019 08:17), Max: 97.8 (01 Jan 2019 08:17)  HR: 103 (01 Jan 2019 08:17) (96 - 114)  BP: 107/90 (01 Jan 2019 08:17) (107/71 - 117/76)  BP(mean): --  RR: 22 (01 Jan 2019 08:17) (17 - 22)  SpO2: 97% (01 Jan 2019 08:17) (97% - 99%)    Pt received Iv morphine for resp distress- Examined pt at bedside- Pt minimally responsive and lethargic - Unable to follow commands- ABG done with electrolytes- Left a voice message with DR Howe-  MICU consult called- Advised to call back after the ABG results-   Will await ABG - Per chart record-Family wants pt to be full code- irrespective that pt is not a chemo candidate and overall prognosis is poor- Will continue to monitor.

## 2019-01-01 NOTE — PROGRESS NOTE ADULT - MENTAL STATUS
less responsive than yesterday
not responsive to verbal questions
awake but still slow but appropriate
awake and responds appropriately to verbal questions, although slowly
lethargic
responds slowly as before

## 2019-01-01 NOTE — PROGRESS NOTE ADULT - ATTENDING COMMENTS
Prognosis is guarded, not a candidate for any chemo at this time
Prognosis is poor, not a candidate for any chemo at this time
Patient seen and examined.  Agree with fellow note.
Patient seen and examined.  Agree with fellow note as above.
Prognosis is poor, not a candidate for any chemo at this time
Prognosis is guarded, not a candidate for any chemo at this time
Prognosis is poor, family at bedside understands
Patient seen and examined.  Agree with fellow note.
Prognosis is guarded, not a candidate for any chemo at this time

## 2019-01-01 NOTE — PROVIDER CONTACT NOTE (OTHER) - ASSESSMENT
see v/s flow sheet
Patient's blood pressure is 98/69. Patient's blood pressure has been running on the lower side.
Pt appears to be in distress and requires pain medication
Pt is arousable to touch however when I crush her medications and mix them with water, she will drink some of the medication but does not appear to swallow.  She coughs as well when I try to hydrate her

## 2019-01-01 NOTE — CONSULT NOTE ADULT - SUBJECTIVE AND OBJECTIVE BOX
CHIEF COMPLAINT: respiratory distress    HPI:   62yo Female, ambulatory with assistance, with h/o HTN and stage IV metastatic breast ca (liver, spine) presenting with worsening fatigue and generalized weakness. For the past month patient has require assistance with ambulation. Patient was admitted for sepsis secondary to e.coli bacteremia as well as klebsiella UTI and extensive oropharyngeal candidiasis. Her hospital course has progressed with rapidly declining mental and functional status and patient has been minimally verbal over the past few days. 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  Patient was seen by oncology Dr. Eng who has had multiple conversations with family that the disease is progressive and patient's treatment options are limited.     MICU was called today due to metabolic acidosis.      PAST MEDICAL & SURGICAL HISTORY:  Hypertension  Metastatic breast cancer  History of right mastectomy      FAMILY HISTORY:  Family history of hypertension (Mother)      HOME MEDICATIONS:    HOSPITAL MEDICATIONS:  MEDICATIONS  (STANDING):  ALBUTerol/ipratropium for Nebulization 3 milliLiter(s) Nebulizer every 6 hours  dextrose 5% + sodium chloride 0.9%. 1000 milliLiter(s) (75 mL/Hr) IV Continuous <Continuous>  ganciclovir IVPB 400 milliGRAM(s) IV Intermittent every 12 hours  influenza   Vaccine 0.5 milliLiter(s) IntraMuscular once  nystatin    Suspension 937685 Unit(s) Oral four times a day  piperacillin/tazobactam IVPB. 3.375 Gram(s) IV Intermittent every 8 hours  polyethylene glycol 3350 17 Gram(s) Oral daily  senna 2 Tablet(s) Oral at bedtime    MEDICATIONS  (PRN):  bisacodyl Suppository 10 milliGRAM(s) Rectal daily PRN Constipation  glycopyrrolate Injectable 0.4 milliGRAM(s) IV Push every 6 hours PRN upper airway secretions  LORazepam   Injectable 0.5 milliGRAM(s) IV Push every 4 hours PRN agitation / anxiety  metoclopramide Injectable 10 milliGRAM(s) IV Push every 6 hours PRN Nausea/vomiting  morphine  - Injectable 1 milliGRAM(s) IV Push every 2 hours PRN sob/ labored breathing  morphine  - Injectable 1 milliGRAM(s) IV Push every 4 hours PRN Severe Pain (7 - 10)  ondansetron Injectable 4 milliGRAM(s) IV Push every 6 hours PRN Nausea and/or Vomiting      ROS: Unable to obtain as pt not responsive to verbal stimuli.     OBJECTIVE:  ICU Vital Signs Last 24 Hrs  T(C): 36.5 (2019 13:40), Max: 36.6 (2019 08:17)  T(F): 97.7 (2019 13:40), Max: 97.8 (2019 08:17)  HR: 98 (2019 16:26) (96 - 114)  BP: 88/56 (2019 16:26) (88/56 - 117/76)  BP(mean): --  ABP: --  ABP(mean): --  RR: 26 (2019 16:26) (18 - 26)  SpO2: 94% (2019 16:26) (94% - 99%)        CAPILLARY BLOOD GLUCOSE        General: Breathing agonally, minimally responsive   HEAD: Severe cachexia 0  ENT: extensive thrush  Chest: agonal breath sounds, clear lungs, no wheezing.    Heart: regular, tachycardic   Abd: Nl BS, soft, NT, ND   Ext: 1+ peripheral pulses, no clubbing, cyanosis, edema  Neuro: AOx0  Skin: Warm, dry, intact, no rash       LABS:  CBC Full  -  (  @ 07:40 )                        11.7 g/dL  33.7 % )-----------( 34.7 %              27.0 pg  Mean Cell Volume : 77.8 fL  Auto Neutrophil # : 27.8 %  Auto Lymphocyte # : 9.86 K/uL  Auto Monocyte # : x  Auto Eosinophil # : x  Auto Basophil # : x  CBC Full  -  (  @ 04:50 )                        12.3 g/dL  35.0 % )-----------( 35.1 %              26.4 pg  Mean Cell Volume : 75.1 fL  Auto Neutrophil # : 24.9 %  Auto Lymphocyte # : 12.69 K/uL  Auto Monocyte # : x  Auto Eosinophil # : x  Auto Basophil # : x        141  |  106  |  56<H>  ----------------------------<  130<H>  5.8<H>   |  7<LL>  |  1.21    Ca    9.7      2019 07:40  Phos  4.0       Mg     2.5         TPro  4.8<L>  /  Alb  1.8<L>  /  TBili  15.2<H>  /  DBili  9.9<H>  /  AST  490<H>  /  ALT  450<H>  /  AlkPhos  242<H>          VBG:   AB.26/20/160/12  Lactate = --      RADIOLOGY:  Xray -   CT -   < from: CT Abdomen and Pelvis w/ IV Cont (18 @ 18:53) >  IMPRESSION:     Status post right mastectomy. Numerous hepatic and pulmonary metastases.   Destructive lytic lesion of the L4 vertebral body. Correlation with prior   imaging is recommended.    Myomatous uterus. A heterogeneous low-attenuation area in the fundal   region may be secondary to myomatous involvement versus abnormal   thickened endometrium.. Pelvic and transvaginal ultrasound may be   obtained as clinically indicated.    A mass is noted in the left breast the inner left breast. Correlation   with any prior imaging is suggested..    < end of copied text >    MRI -   < from: MR Head w/ IV Cont (18 @ 02:52) >  Impression: Extra-axial lesion is seen involving left parietal region   which could be compatible meningioma versus dural metastasis.    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    Osseous metastasis as described above.    < end of copied text >

## 2019-01-01 NOTE — PROGRESS NOTE ADULT - [facility] HE PE MLT D E PC
regular rate and rhythm

## 2019-01-01 NOTE — PROGRESS NOTE ADULT - PROBLEM SELECTOR PLAN 9
Lovenox sc DVT ppx in the setting of malignancy
Lovenox sc DVT ppx in the setting of malignancy  Screening EKG (pending)

## 2019-01-01 NOTE — PROGRESS NOTE ADULT - PROBLEM SELECTOR PROBLEM 1
SIRS (systemic inflammatory response syndrome)
Metastatic breast cancer
SIRS (systemic inflammatory response syndrome)

## 2019-01-01 NOTE — PROGRESS NOTE ADULT - SUBJECTIVE AND OBJECTIVE BOX
Events since yesterday noted, had a family meeting with the son, daughter, grand daughter and a friend in the office. Pt is clinically not much changed. Remains lethargic and responds very slowly to the verbal commands. Her boy friend is with her today.       Meds:  bisacodyl Suppository 10 milliGRAM(s) Rectal daily PRN  dextrose 5% + sodium chloride 0.9%. 1000 milliLiter(s) IV Continuous <Continuous>  enoxaparin Injectable 40 milliGRAM(s) SubCutaneous every 24 hours  ganciclovir IVPB 400 milliGRAM(s) IV Intermittent every 12 hours  influenza   Vaccine 0.5 milliLiter(s) IntraMuscular once  lactulose Syrup 10 Gram(s) Oral two times a day  metoclopramide Injectable 10 milliGRAM(s) IV Push every 6 hours PRN  morphine  - Injectable 1 milliGRAM(s) IV Push every 4 hours PRN  nystatin    Suspension 776436 Unit(s) Oral four times a day  ondansetron Injectable 4 milliGRAM(s) IV Push every 6 hours PRN  piperacillin/tazobactam IVPB. 3.375 Gram(s) IV Intermittent every 8 hours  polyethylene glycol 3350 17 Gram(s) Oral daily  senna 2 Tablet(s) Oral at bedtime      Vital Signs Last 24 Hrs  T(C): 36.4 (28 Dec 2018 06:01), Max: 37 (27 Dec 2018 22:18)  T(F): 97.6 (28 Dec 2018 06:01), Max: 98.6 (27 Dec 2018 22:18)  HR: 69 (28 Dec 2018 10:59) (69 - 104)  BP: 122/57 (28 Dec 2018 10:59) (98/62 - 122/57)  BP(mean): --  RR: 18 (28 Dec 2018 10:59) (16 - 18)  SpO2: 100% (28 Dec 2018 10:59) (96% - 100%)                          13.0   12.10 )-----------( 42       ( 28 Dec 2018 06:28 )             36.0       12-28    138  |  101  |  32<H>  ----------------------------<  136<H>  4.1   |  13<L>  |  0.83    Ca    8.8      28 Dec 2018 06:28  Phos  2.6     12-28  Mg     2.3     12-28    TPro  5.0<L>  /  Alb  2.0<L>  /  TBili  9.9<H>  /  DBili  x   /  AST  883<H>  /  ALT  543<H>  /  AlkPhos  284<H>  12-28
Infectious Diseases progress note:    Subjective:  WBC improving.  CTap with hepatic and pulm mets.  Bcx growing E.coli, Ucx (+) Klebsiella.  States abd pain is about the same.  No n/v/diarrhea/fever/chills/dysuria.    ROS:  CONSTITUTIONAL:  No fever, chills, rigors  CARDIOVASCULAR:  No chest pain or palpitations  RESPIRATORY:   No SOB, cough, dyspnea on exertion.  No wheezing  GASTROINTESTINAL:  (+) abd pain  EXTREMITIES:  No swelling or joint pain  GENITOURINARY:  No burning on urination, increased frequency or urgency.  No flank pain  NEUROLOGIC:  No HA, visual disturbances  SKIN: No rashes    Allergies    No Known Allergies    Intolerances        ANTIBIOTICS/RELEVANT:  antimicrobials  nystatin    Suspension 320776 Unit(s) Oral four times a day  piperacillin/tazobactam IVPB. 3.375 Gram(s) IV Intermittent every 8 hours    immunologic:  influenza   Vaccine 0.5 milliLiter(s) IntraMuscular once    OTHER:  enoxaparin Injectable 40 milliGRAM(s) SubCutaneous every 24 hours  metoclopramide Injectable 10 milliGRAM(s) IV Push every 6 hours PRN  ondansetron Injectable 4 milliGRAM(s) IV Push every 6 hours PRN  oxyCODONE    IR 5 milliGRAM(s) Oral every 8 hours PRN  polyethylene glycol 3350 17 Gram(s) Oral daily  senna 2 Tablet(s) Oral at bedtime      Objective:  Vital Signs Last 24 Hrs  T(C): 37 (22 Dec 2018 13:37), Max: 37 (22 Dec 2018 13:37)  T(F): 98.6 (22 Dec 2018 13:37), Max: 98.6 (22 Dec 2018 13:37)  HR: 98 (22 Dec 2018 13:37) (79 - 98)  BP: 111/78 (22 Dec 2018 13:37) (109/74 - 120/78)  BP(mean): --  RR: 18 (22 Dec 2018 13:37) (18 - 18)  SpO2: 97% (22 Dec 2018 13:37) (97% - 100%)    PHYSICAL EXAM:  Constitutional:NAD  Eyes:BALBIR, EOMI  Ear/Nose/Throat: no thrush, mucositis.  Moist mucous membranes	  Neck:no JVD, no lymphadenopathy, supple  Respiratory: CTA delores  Cardiovascular: S1S2 RRR, no murmurs  Gastrointestinal:soft, nontender,  nondistended (+) BS, no guarding or rebound  Extremities:no e/e/c  Skin:  no rashes, open wounds or ulcerations        LABS:                        13.6   12.77 )-----------( 69       ( 22 Dec 2018 05:18 )             37.7         141  |  99  |  27<H>  ----------------------------<  118<H>  4.5   |  25  |  0.69    Ca    9.5      22 Dec 2018 05:18    TPro  5.6<L>  /  Alb  2.7<L>  /  TBili  6.6<H>  /  DBili  x   /  AST  440<H>  /  ALT  420<H>  /  AlkPhos  360<H>        Urinalysis Basic - ( 21 Dec 2018 09:45 )    Color: DARK YELLOW / Appearance: Lt TURBID / S.025 / pH: 6.0  Gluc: NEGATIVE / Ketone: TRACE  / Bili: TRACE / Urobili: MODERATE   Blood: TRACE / Protein: 20 / Nitrite: NEGATIVE   Leuk Esterase: SMALL / RBC: 6-10 / WBC 6-10   Sq Epi: FEW / Non Sq Epi: x / Bacteria: MODERATE          MICROBIOLOGY:    Culture - Blood (18 @ 13:20)    Culture - Blood:   NO ORGANISMS ISOLATED  NO ORGANISMS ISOLATED AT 24 HOURS    Specimen Source: BLOOD PERIPHERAL    Culture - Urine (18 @ 10:55)    Culture - Urine:   KLP^Klebsiella pneumoniae  COLONY COUNT: > = 100,000 CFU/ML    Specimen Source: URINE MIDSTREAM    Culture - Blood (18 @ 19:12)    -  Escherichia coli: + DETECT RENETTA    Culture - Blood:   ***Blood Panel PCR results on this specimen are available  approximately 3 hours after the Gram stain result***  Gram stain, PCR, and/or culture results may not always  correspond due to difference in methodologies  ------------------------------------------------------------  This PCR assay was performed using [employer].  The  following targets are tested for:  Enterococcus, vancomycin  resistant enterococci, Listeria monocytogenes,  coagulase  negative staphylococci, S. aureus, methicillin resistant S.  aureus, Streptococcus agalactiae (Group B), S. pneumoniae,  S. pyogenes (Group A), Acinetobacter baumannii, Enterobacter  cloacae, E. coli, Klebsiella oxytoca, K. pneumoniae, Proteus  sp., Serratia marcescens, Haemophilus influenzae, Neisseria  meningitidis, Pseudomonas aeruginosa, Candida albicans, C.  glabrata, C. krusei, C. parapsilosis, C. tropicalis and the  KPC resistance gene.  **NOTE: Due to technical problems, Proteus sp. will NOT be  reported as part of the BCID paneluntil further notice.    Specimen Source: BLOOD VENOUS    Organism: BLOOD CULTURE PCR    Organism: Escherichia coli    Gram Stain Blood:   ***** CRITICAL RESULT *****  PERSON CALLED / READ-BACK: ELISE FINNEGAN  DATE / TIME CALLED: 1842  CALLED BY: JORGE LO^Gram Neg Rods  AFTER: 10 HOURS INCUBATION  BOTTLE: AEROBIC   ANAEROBIC BOTTLES    Organism Identification: BLOOD CULTURE PCR  Escherichia coli    Method Type: PCR    Culture - Blood (18 @ 19:12)    Culture - Blood:   NO ORGANISMS ISOLATED  NO ORGANISMS ISOLATED AT 24 HOURS    Specimen Source: BLOOD PERIPHERAL          RADIOLOGY & ADDITIONAL STUDIES:    < from: CT Abdomen and Pelvis w/ IV Cont (18 @ 18:53) >  IMPRESSION:     Status post right mastectomy. Numerous hepatic and pulmonary metastases.   Destructive lytic lesion of the L4 vertebral body. Correlation with prior   imaging is recommended.    Myomatous uterus. A heterogeneous low-attenuation area in the fundal   region may be secondary to myomatous involvement versus abnormal   thickened endometrium.. Pelvic and transvaginal ultrasound may be   obtained as clinically indicated.    A mass is noted in the left breast the inner left breast. Correlation   with any prior imaging is suggested..    < end of copied text >        < from: Xray Chest 1 View AP/PA (18 @ 19:20) >  FINDINGS:  Multiple well-circumscribed opacities throughout the bilateral lung   fields, most consistent with metastatic breast cancer. The right   hemidiaphragm is elevated. No pleural effusions or pneumothorax. The   cardiomediastinal silhouette is poorly evaluated on this projection. No   acute osseous findings.    IMPRESSION:   Findings consistent with metastatic lung disease. More accurate   evaluation could be obtained by chest CT if clinically indicated.    < end of copied text >
Infectious Diseases progress note:    Subjective: Resting, minimally verbal.  NAD.  AFebrile.      ROS:  MInimally verbal, unable to assess    Allergies    No Known Allergies    Intolerances        ANTIBIOTICS/RELEVANT:  antimicrobials  ganciclovir IVPB 400 milliGRAM(s) IV Intermittent every 12 hours  nystatin    Suspension 799895 Unit(s) Oral four times a day  piperacillin/tazobactam IVPB. 3.375 Gram(s) IV Intermittent every 8 hours    immunologic:  influenza   Vaccine 0.5 milliLiter(s) IntraMuscular once    OTHER:  bisacodyl Suppository 10 milliGRAM(s) Rectal daily PRN  dextrose 5% + sodium chloride 0.9%. 1000 milliLiter(s) IV Continuous <Continuous>  metoclopramide Injectable 10 milliGRAM(s) IV Push every 6 hours PRN  morphine  - Injectable 1 milliGRAM(s) IV Push every 4 hours PRN  ondansetron Injectable 4 milliGRAM(s) IV Push every 6 hours PRN  polyethylene glycol 3350 17 Gram(s) Oral daily  senna 2 Tablet(s) Oral at bedtime      Objective:  Vital Signs Last 24 Hrs  T(C): 36.3 (29 Dec 2018 05:57), Max: 36.3 (28 Dec 2018 21:51)  T(F): 97.3 (29 Dec 2018 05:57), Max: 97.3 (28 Dec 2018 21:51)  HR: 96 (29 Dec 2018 05:57) (72 - 96)  BP: 118/83 (29 Dec 2018 05:57) (96/72 - 129/92)  BP(mean): --  RR: 19 (29 Dec 2018 05:57) (18 - 19)  SpO2: 100% (29 Dec 2018 05:57) (99% - 100%)    PHYSICAL EXAM:  Constitutional:NAD  Eyes:BALBIR, EOMI  Ear/Nose/Throat: no thrush, mucositis.  Moist mucous membranes	  Neck:no JVD, no lymphadenopathy, supple  Respiratory: CTA delores  Cardiovascular: S1S2 RRR, no murmurs  Gastrointestinal:soft, nontender,  nondistended (+) BS  Extremities: trace extremity edema  Skin:  no rashes, open wounds or ulcerations  :  watts draining reddish/brown urine        LABS:                        11.0   13.16 )-----------( 29       ( 29 Dec 2018 06:57 )             31.5     12-29    140  |  104  |  41<H>  ----------------------------<  150<H>  3.7   |  11<L>  |  0.99    Ca    9.0      29 Dec 2018 09:30  Phos  2.7     12-29  Mg     2.4     12-29    TPro  4.9<L>  /  Alb  2.1<L>  /  TBili  12.7<H>  /  DBili  x   /  AST  1268<H>  /  ALT  755<H>  /  AlkPhos  296<H>  12-29                            MICROBIOLOGY:    Culture - Blood (12.22.18 @ 06:03)    Culture - Blood:   NO ORGANISMS ISOLATED    Specimen Source: BLOOD PERIPHERAL    Culture - Blood (12.21.18 @ 13:20)    Culture - Blood:   NO ORGANISMS ISOLATED    Specimen Source: BLOOD PERIPHERAL    Culture - Urine (12.21.18 @ 10:55)    -  Amikacin: S <=8 RENETTA    -  Ampicillin: R >16 RENETTA    -  Ampicillin/Sulbactam: S <=4/2 RENETTA    -  Aztreonam: S <=4 RENETTA    -  Cefazolin: S <=2 RENETTA    -  Cefepime: S <=2 RENETTA    -  Cefoxitin: S <=4 RENETTA    -  Ceftazidime: S <=1 RENETTA    -  Ceftriaxone: S <=1 RENETTA    -  Ciprofloxacin: S <=0.5 RENETTA    -  Ertapenem: S <=0.5 RENETTA    -  Gentamicin: S <=1 RENETTA    -  Imipenem: S <=1 RENETTA    -  Levofloxacin: S <=1 RENETTA    -  Meropenem: S <=1 RENETTA    -  Nitrofurantoin: I 64 RENETTA    -  Piperacillin/Tazobactam: S <=8 RENETTA    -  Tigecycline: S    -  Tobramycin: S <=2 RENETTA    -  Trimethoprim/Sulfamethoxazole: S <=0.5/9.5 RENETTA    Culture - Urine:   COLONY COUNT: > = 100,000 CFU/ML    Specimen Source: URINE MIDSTREAM    Organism Identification: Klebsiella pneumoniae    Organism: Klebsiella pneumoniae    Method Type: NEGATIVE RENETTA 43    Culture - Blood (12.20.18 @ 19:12)    -  Cefazolin: S <=2 RENETTA    -  Aztreonam: S <=4 RENETTA    -  Ampicillin/Sulbactam: S <=4/2 RENETTA    -  Ampicillin: S 4 RENETTA    -  Amikacin: S <=8 RENETTA    Culture - Blood:   ***Blood Panel PCR results on this specimen are available  approximately 3 hours after the Gram stain result***  Gram stain, PCR, and/or culture results may not always  correspond due to difference in methodologies  ------------------------------------------------------------  This PCR assay was performed using [employer].  The  following targets are tested for:  Enterococcus, vancomycin  resistant enterococci, Listeria monocytogenes,  coagulase  negative staphylococci, S. aureus, methicillin resistant S.  aureus, Streptococcus agalactiae (Group B), S. pneumoniae,  S. pyogenes (Group A), Acinetobacter baumannii, Enterobacter  cloacae, E. coli, Klebsiella oxytoca, K. pneumoniae, Proteus  sp., Serratia marcescens, Haemophilus influenzae, Neisseria  meningitidis, Pseudomonas aeruginosa, Candida albicans, C.  glabrata, C. krusei, C. parapsilosis, C. tropicalis and the  KPC resistance gene.  **NOTE: Due to technical problems, Proteus sp. will NOT be  reported as part of the BCID paneluntil further notice.    -  Trimethoprim/Sulfamethoxazole: S <=0.5/9.5 RENETTA    -  Tobramycin: S <=2 RENETTA    -  Tigecycline: S <=1 RENETTA    -  Piperacillin/Tazobactam: S <=8 RENETTA    -  Meropenem: S <=1 RENETTA    -  Levofloxacin: S <=1 RENETTA    -  Imipenem: S <=1 RENETTA    -  Gentamicin: S <=1 RENETTA    -  Ertapenem: S <=0.5 RENETTA    -  Ciprofloxacin: S <=0.5 RENETTA    -  Ceftriaxone: S <=1 RENETTA    -  Ceftazidime: S <=1 RENETTA    -  Cefoxitin: S 8 RENETTA    -  Cefepime: S <=2 RENETTA    -  Escherichia coli: + DETECT RENETTA    Specimen Source: BLOOD VENOUS    Organism: BLOOD CULTURE PCR    Organism: Escherichia coli    Gram Stain Blood:   ***** CRITICAL RESULT *****  PERSON CALLED / READ-BACK: ELISE FINNEGAN  DATE / TIME CALLED: 12/21/18 0642  CALLED BY: JORGE LO^Gram Neg Rods  AFTER: 10 HOURS INCUBATION  BOTTLE: AEROBIC   ANAEROBIC BOTTLES    Organism Identification: BLOOD CULTURE PCR  Escherichia coli    Method Type: PCR    Method Type: NEGATIVE RENETTA 43          RADIOLOGY & ADDITIONAL STUDIES:
Infectious Diseases progress note:    Subjective: c/o abd pain.  No new fevers.  No n/v/diarrhea    ROS:  CONSTITUTIONAL:  No fever, chills, rigors  CARDIOVASCULAR:  No chest pain or palpitations  RESPIRATORY:   No SOB, cough, dyspnea on exertion.  No wheezing  GASTROINTESTINAL:  No abd pain, N/V, diarrhea/constipation  EXTREMITIES:  No swelling or joint pain  GENITOURINARY:  No burning on urination, increased frequency or urgency.  No flank pain  NEUROLOGIC:  No HA, visual disturbances  SKIN: No rashes    Allergies    No Known Allergies    Intolerances        ANTIBIOTICS/RELEVANT:  antimicrobials  ganciclovir IVPB 400 milliGRAM(s) IV Intermittent every 12 hours  nystatin    Suspension 025844 Unit(s) Oral four times a day  piperacillin/tazobactam IVPB. 3.375 Gram(s) IV Intermittent every 8 hours    immunologic:  influenza   Vaccine 0.5 milliLiter(s) IntraMuscular once    OTHER:  bisacodyl Suppository 10 milliGRAM(s) Rectal daily PRN  dextrose 5% + sodium chloride 0.9%. 1000 milliLiter(s) IV Continuous <Continuous>  enoxaparin Injectable 40 milliGRAM(s) SubCutaneous every 24 hours  metoclopramide Injectable 10 milliGRAM(s) IV Push every 6 hours PRN  morphine  - Injectable 2 milliGRAM(s) IV Push every 4 hours PRN  ondansetron Injectable 4 milliGRAM(s) IV Push every 6 hours PRN  oxyCODONE    IR 5 milliGRAM(s) Oral every 8 hours PRN  polyethylene glycol 3350 17 Gram(s) Oral daily  senna 2 Tablet(s) Oral at bedtime      Objective:  Vital Signs Last 24 Hrs  T(C): 36.8 (26 Dec 2018 05:10), Max: 36.8 (26 Dec 2018 05:10)  T(F): 98.3 (26 Dec 2018 05:10), Max: 98.3 (26 Dec 2018 05:10)  HR: 94 (26 Dec 2018 05:10) (89 - 106)  BP: 104/76 (26 Dec 2018 05:10) (104/76 - 109/74)  BP(mean): --  RR: 18 (26 Dec 2018 05:10) (18 - 18)  SpO2: 98% (26 Dec 2018 05:10) (98% - 100%)    PHYSICAL EXAM:  Constitutional:NAD  Eyes:BALBIR, EOMI  Ear/Nose/Throat: no thrush, mucositis.  Moist mucous membranes	  Neck:no JVD, no lymphadenopathy, supple  Respiratory: CTA delores  Cardiovascular: S1S2 RRR, no murmurs  Gastrointestinal:soft, nontender,  nondistended (+) BS  Extremities:no e/e/c  Skin:  no rashes, open wounds or ulcerations        LABS:                        11.7   15.19 )-----------( 37       ( 26 Dec 2018 06:07 )             32.4     12-26    143  |  105  |  24<H>  ----------------------------<  137<H>  3.8   |  21<L>  |  0.86    Ca    8.8      26 Dec 2018 06:07    TPro  4.8<L>  /  Alb  2.0<L>  /  TBili  7.8<H>  /  DBili  x   /  AST  777<H>  /  ALT  436<H>  /  AlkPhos  284<H>  12-26              MICROBIOLOGY:    Culture - Blood (12.22.18 @ 06:03)    Culture - Blood:   NO ORGANISMS ISOLATED  NO ORGANISMS ISOLATED AT 96 HOURS    Specimen Source: BLOOD PERIPHERAL    Culture - Blood (12.21.18 @ 13:20)    Culture - Blood:   NO ORGANISMS ISOLATED    Specimen Source: BLOOD PERIPHERAL    Culture - Urine (12.21.18 @ 10:55)    -  Amikacin: S <=8 RENETTA    -  Ampicillin: R >16 RENETTA    -  Ampicillin/Sulbactam: S <=4/2 RENETTA    -  Aztreonam: S <=4 RENETTA    -  Cefazolin: S <=2 RENETTA    -  Cefepime: S <=2 RENETTA    -  Cefoxitin: S <=4 RENETTA    -  Ceftazidime: S <=1 RENETTA    -  Ceftriaxone: S <=1 RENETTA    -  Ciprofloxacin: S <=0.5 RENETTA    -  Ertapenem: S <=0.5 RENETTA    -  Gentamicin: S <=1 RENETTA    -  Imipenem: S <=1 RENETTA    -  Levofloxacin: S <=1 RENETTA    -  Meropenem: S <=1 RENETTA    -  Nitrofurantoin: I 64 RENETTA    -  Piperacillin/Tazobactam: S <=8 RENETTA    -  Tigecycline: S    -  Tobramycin: S <=2 RENETTA    -  Trimethoprim/Sulfamethoxazole: S <=0.5/9.5 RENETTA    Culture - Urine:   COLONY COUNT: > = 100,000 CFU/ML    Specimen Source: URINE MIDSTREAM    Organism Identification: Klebsiella pneumoniae    Organism: Klebsiella pneumoniae    Method Type: NEGATIVE RENETTA 43    Culture - Blood (12.20.18 @ 19:12)    -  Aztreonam: S <=4 RENETTA    -  Ampicillin/Sulbactam: S <=4/2 RENETTA    -  Ampicillin: S 4 RENETTA    -  Amikacin: S <=8 RENETTA    -  Gentamicin: S <=1 RENETTA    -  Ertapenem: S <=0.5 RENETTA    -  Ciprofloxacin: S <=0.5 RENETTA    -  Ceftriaxone: S <=1 RENETTA    -  Ceftazidime: S <=1 RENETTA    -  Cefoxitin: S 8 RENETTA    -  Cefepime: S <=2 RENETTA    -  Cefazolin: S <=2 RENETTA    -  Trimethoprim/Sulfamethoxazole: S <=0.5/9.5 RENETTA    -  Tobramycin: S <=2 RENETTA    -  Tigecycline: S <=1 RENETTA    -  Piperacillin/Tazobactam: S <=8 RENETTA    -  Meropenem: S <=1 RENETTA    -  Levofloxacin: S <=1 RENETTA    -  Imipenem: S <=1 RENETTA    Culture - Blood:   ***Blood Panel PCR results on this specimen are available  approximately 3 hours after the Gram stain result***  Gram stain, PCR, and/or culture results may not always  correspond due to difference in methodologies  ------------------------------------------------------------  This PCR assay was performed using [employer].  The  following targets are tested for:  Enterococcus, vancomycin  resistant enterococci, Listeria monocytogenes,  coagulase  negative staphylococci, S. aureus, methicillin resistant S.  aureus, Streptococcus agalactiae (Group B), S. pneumoniae,  S. pyogenes (Group A), Acinetobacter baumannii, Enterobacter  cloacae, E. coli, Klebsiella oxytoca, K. pneumoniae, Proteus  sp., Serratia marcescens, Haemophilus influenzae, Neisseria  meningitidis, Pseudomonas aeruginosa, Candida albicans, C.  glabrata, C. krusei, C. parapsilosis, C. tropicalis and the  KPC resistance gene.  **NOTE: Due to technical problems, Proteus sp. will NOT be  reported as part of the BCID paneluntil further notice.    -  Escherichia coli: + DETECT RENETTA    Specimen Source: BLOOD VENOUS    Organism: BLOOD CULTURE PCR    Organism: Escherichia coli    Gram Stain Blood:   ***** CRITICAL RESULT *****  PERSON CALLED / READ-BACK: ELISE FINNEGAN  DATE / TIME CALLED: 12/21/18 0642  CALLED BY: JORGE LO^Gram Neg Rods  AFTER: 10 HOURS INCUBATION  BOTTLE: AEROBIC   ANAEROBIC BOTTLES    Organism Identification: BLOOD CULTURE PCR  Escherichia coli    Method Type: PCR    Method Type: NEGATIVE RENETTA 43    Culture - Blood (12.20.18 @ 19:12)    Culture - Blood:   NO ORGANISMS ISOLATED    Specimen Source: BLOOD PERIPHERAL          RADIOLOGY & ADDITIONAL STUDIES:    < from: MR Head w/ IV Cont (12.23.18 @ 02:52) >  Impression: Extra-axial lesion is seen involving left parietal region   which could be compatible meningioma versus dural metastasis.    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    Osseous metastasis as described above.    < end of copied text >
Patient is a 63y old  Female who presents with a chief complaint of Weakness (27 Dec 2018 11:33)      SUBJECTIVE / OVERNIGHT EVENTS:  Afebrile, lethargic  Review of Systems:   CONSTITUTIONAL: No fever, weight loss, or fatigue  EYES: No eye pain, visual disturbances, or discharge  ENMT:  No difficulty hearing, tinnitus, vertigo; No sinus or throat pain  NECK: No pain or stiffness  BREASTS: No pain, masses, or nipple discharge  RESPIRATORY: No cough, wheezing, chills or hemoptysis; No shortness of breath  CARDIOVASCULAR: No chest pain, palpitations, dizziness, or leg swelling  GASTROINTESTINAL: No abdominal or epigastric pain. No nausea, vomiting, or hematemesis; No diarrhea or constipation. No melena or hematochezia.  GENITOURINARY: No dysuria, frequency, hematuria, or incontinence  NEUROLOGICAL: No headaches, memory loss, loss of strength, numbness, or tremors  SKIN: No itching, burning, rashes, or lesions   LYMPH NODES: No enlarged glands  ENDOCRINE: No heat or cold intolerance; No hair loss  MUSCULOSKELETAL: No joint pain or swelling; No muscle, back, or extremity pain  PSYCHIATRIC: No depression, anxiety, mood swings, or difficulty sleeping  HEME/LYMPH: No easy bruising, or bleeding gums  ALLERY AND IMMUNOLOGIC: No hives or eczema    MEDICATIONS  (STANDING):  dextrose 5% + sodium chloride 0.9%. 1000 milliLiter(s) (75 mL/Hr) IV Continuous <Continuous>  enoxaparin Injectable 40 milliGRAM(s) SubCutaneous every 24 hours  ganciclovir IVPB 400 milliGRAM(s) IV Intermittent every 12 hours  influenza   Vaccine 0.5 milliLiter(s) IntraMuscular once  lactulose Syrup 10 Gram(s) Oral two times a day  nystatin    Suspension 299451 Unit(s) Oral four times a day  piperacillin/tazobactam IVPB. 3.375 Gram(s) IV Intermittent every 8 hours  polyethylene glycol 3350 17 Gram(s) Oral daily  senna 2 Tablet(s) Oral at bedtime  sodium phosphate IVPB 15 milliMole(s) IV Intermittent once    MEDICATIONS  (PRN):  bisacodyl Suppository 10 milliGRAM(s) Rectal daily PRN Constipation  metoclopramide Injectable 10 milliGRAM(s) IV Push every 6 hours PRN Nausea/vomiting  morphine  - Injectable 2 milliGRAM(s) IV Push every 4 hours PRN pain  ondansetron Injectable 4 milliGRAM(s) IV Push every 6 hours PRN Nausea and/or Vomiting      PHYSICAL EXAM:  Vital Signs Last 24 Hrs  T(C): 36.3 (27 Dec 2018 06:05), Max: 36.4 (26 Dec 2018 20:12)  T(F): 97.3 (27 Dec 2018 06:05), Max: 97.5 (26 Dec 2018 20:12)  HR: 76 (27 Dec 2018 11:05) (76 - 102)  BP: 142/92 (27 Dec 2018 11:05) (103/68 - 142/92)  BP(mean): --  RR: 17 (27 Dec 2018 06:05) (17 - 17)  SpO2: 97% (27 Dec 2018 06:05) (97% - 97%)  I&O's Summary    26 Dec 2018 07:01  -  27 Dec 2018 07:00  --------------------------------------------------------  IN: 0 mL / OUT: 100 mL / NET: -100 mL    27 Dec 2018 07:01  -  27 Dec 2018 15:50  --------------------------------------------------------  IN: 0 mL / OUT: 240 mL / NET: -240 mL      GENERAL: NAD, well-developed  HEAD:  Atraumatic, Normocephalic  EYES: EOMI, PERRLA, conjunctiva and sclera clear  NECK: Supple, No JVD  CHEST/LUNG: Clear to auscultation bilaterally; No wheeze  HEART: Regular rate and rhythm; No murmurs, rubs, or gallops  ABDOMEN: Soft, Nontender, Nondistended; Bowel sounds present  EXTREMITIES:  2+ Peripheral Pulses, No clubbing, cyanosis, or edema  PSYCH: AAOx3  NEUROLOGY: non-focal  SKIN: No rashes or lesions    LABS:  CAPILLARY BLOOD GLUCOSE                              12.7   12.09 )-----------( 31       ( 27 Dec 2018 06:30 )             37.1     12-27    139  |  104  |  27<H>  ----------------------------<  105<H>  4.8   |  16<L>  |  0.85    Ca    8.8      27 Dec 2018 06:30  Phos  2.2     12-27  Mg     2.3     12-27    TPro  5.0<L>  /  Alb  2.0<L>  /  TBili  8.5<H>  /  DBili  6.3<H>  /  AST  642<H>  /  ALT  422<H>  /  AlkPhos  281<H>  12-27              RADIOLOGY & ADDITIONAL TESTS:    Imaging Personally Reviewed:    Consultant(s) Notes Reviewed:      Care Discussed with Consultants/Other Providers:
Pt feels no better, c/o abdominal pain on and off, no nausea or vomiting. Denies any headache, any cough or breathing difficulty. As per night RN, no bleeding from any sources.      Meds:  bisacodyl Suppository 10 milliGRAM(s) Rectal daily PRN  dextrose 5% + sodium chloride 0.9%. 1000 milliLiter(s) IV Continuous <Continuous>  enoxaparin Injectable 40 milliGRAM(s) SubCutaneous every 24 hours  ganciclovir IVPB 400 milliGRAM(s) IV Intermittent every 12 hours  influenza   Vaccine 0.5 milliLiter(s) IntraMuscular once  metoclopramide Injectable 10 milliGRAM(s) IV Push every 6 hours PRN  morphine  - Injectable 2 milliGRAM(s) IV Push every 4 hours PRN  nystatin    Suspension 583491 Unit(s) Oral four times a day  ondansetron Injectable 4 milliGRAM(s) IV Push every 6 hours PRN  piperacillin/tazobactam IVPB. 3.375 Gram(s) IV Intermittent every 8 hours  polyethylene glycol 3350 17 Gram(s) Oral daily  senna 2 Tablet(s) Oral at bedtime  sodium phosphate IVPB 15 milliMole(s) IV Intermittent once      Vital Signs Last 24 Hrs  T(C): 36.3 (27 Dec 2018 06:05), Max: 36.6 (26 Dec 2018 15:22)  T(F): 97.3 (27 Dec 2018 06:05), Max: 97.9 (26 Dec 2018 15:22)  HR: 78 (27 Dec 2018 06:05) (78 - 102)  BP: 116/66 (27 Dec 2018 06:05) (102/81 - 116/66)  BP(mean): --  RR: 17 (27 Dec 2018 06:05) (17 - 18)  SpO2: 97% (27 Dec 2018 06:05) (97% - 100%)                          12.7   12.09 )-----------( 31       ( 27 Dec 2018 06:30 )             37.1       12-27    139  |  104  |  27<H>  ----------------------------<  105<H>  4.8   |  16<L>  |  0.85    Ca    8.8      27 Dec 2018 06:30  Phos  2.2     12-27  Mg     2.3     12-27    TPro  5.0<L>  /  Alb  2.0<L>  /  TBili  8.5<H>  /  DBili  6.3<H>  /  AST  642<H>  /  ALT  422<H>  /  AlkPhos  281<H>  12-27
Events since Sunday noted, spoke to Dr Howe in detail yesterday and with Dr Phillips from [facility] today. Pt is more awake, c/o pain in the abdomen but no nausea or vomiting or diarrhea. No headaches, no fevers or chills.       Meds:  bisacodyl Suppository 10 milliGRAM(s) Rectal daily PRN  dextrose 5% + sodium chloride 0.9%. 1000 milliLiter(s) IV Continuous <Continuous>  enoxaparin Injectable 40 milliGRAM(s) SubCutaneous every 24 hours  influenza   Vaccine 0.5 milliLiter(s) IntraMuscular once  metoclopramide Injectable 10 milliGRAM(s) IV Push every 6 hours PRN  nystatin    Suspension 299235 Unit(s) Oral four times a day  ondansetron Injectable 4 milliGRAM(s) IV Push every 6 hours PRN  oxyCODONE    IR 5 milliGRAM(s) Oral every 8 hours PRN  piperacillin/tazobactam IVPB. 3.375 Gram(s) IV Intermittent every 8 hours  polyethylene glycol 3350 17 Gram(s) Oral daily  senna 2 Tablet(s) Oral at bedtime      Vital Signs Last 24 Hrs  T(C): 36.9 (25 Dec 2018 05:21), Max: 36.9 (25 Dec 2018 05:21)  T(F): 98.4 (25 Dec 2018 05:21), Max: 98.4 (25 Dec 2018 05:21)  HR: 96 (25 Dec 2018 05:21) (90 - 96)  BP: 102/63 (25 Dec 2018 05:21) (95/72 - 105/78)  BP(mean): --  RR: 18 (25 Dec 2018 05:21) (16 - 18)  SpO2: 97% (25 Dec 2018 05:21) (96% - 100%)        12-24    142  |  104  |  20  ----------------------------<  127<H>  3.8   |  20<L>  |  0.69    Ca    8.6      24 Dec 2018 05:49    TPro  5.0<L>  /  Alb  2.4<L>  /  TBili  8.5<H>  /  DBili  x   /  AST  882<H>  /  ALT  505<H>  /  AlkPhos  319<H>  12-24    LD > 1800/  CMV Positive.              A/rec:
Events since last visit noted, spoke to Dr Howe yesterday and pt's son on the phone today. Pt is doing worse and is much less responsive today. Breathing is different.       Meds:  ALBUTerol/ipratropium for Nebulization 3 milliLiter(s) Nebulizer every 6 hours  bisacodyl Suppository 10 milliGRAM(s) Rectal daily PRN  dextrose 5% + sodium chloride 0.9%. 1000 milliLiter(s) IV Continuous <Continuous>  ganciclovir IVPB 400 milliGRAM(s) IV Intermittent every 12 hours  influenza   Vaccine 0.5 milliLiter(s) IntraMuscular once  metoclopramide Injectable 10 milliGRAM(s) IV Push every 6 hours PRN  morphine  - Injectable 1 milliGRAM(s) IV Push every 4 hours PRN  nystatin    Suspension 454072 Unit(s) Oral four times a day  ondansetron Injectable 4 milliGRAM(s) IV Push every 6 hours PRN  piperacillin/tazobactam IVPB. 3.375 Gram(s) IV Intermittent every 8 hours  polyethylene glycol 3350 17 Gram(s) Oral daily  senna 2 Tablet(s) Oral at bedtime      Vital Signs Last 24 Hrs  T(C): 36.6 (01 Jan 2019 08:17), Max: 36.6 (01 Jan 2019 08:17)  T(F): 97.8 (01 Jan 2019 08:17), Max: 97.8 (01 Jan 2019 08:17)  HR: 112 (01 Jan 2019 10:01) (98 - 114)  BP: 107/90 (01 Jan 2019 08:17) (107/71 - 117/76)  BP(mean): --  RR: 22 (01 Jan 2019 08:17) (17 - 22)  SpO2: 98% (01 Jan 2019 10:01) (97% - 99%)                          11.7   9.86  )-----------( 33       ( 01 Jan 2019 07:40 )             33.7       01-01    141  |  106  |  56<H>  ----------------------------<  130<H>  5.8<H>   |  7<LL>  |  1.21    Ca    9.7      01 Jan 2019 07:40  Phos  4.0     01-01  Mg     2.5     01-01    TPro  4.8<L>  /  Alb  1.8<L>  /  TBili  15.2<H>  /  DBili  9.9<H>  /  AST  490<H>  /  ALT  450<H>  /  AlkPhos  242<H>  01-01
Events since yesterday noted, pt is on ganciclovir, says she feels a little better, less abdominal pain and overall comfortable. None other active symptoms.      Meds:  bisacodyl Suppository 10 milliGRAM(s) Rectal daily PRN  dextrose 5% + sodium chloride 0.9%. 1000 milliLiter(s) IV Continuous <Continuous>  enoxaparin Injectable 40 milliGRAM(s) SubCutaneous every 24 hours  ganciclovir IVPB 400 milliGRAM(s) IV Intermittent every 12 hours  influenza   Vaccine 0.5 milliLiter(s) IntraMuscular once  metoclopramide Injectable 10 milliGRAM(s) IV Push every 6 hours PRN  nystatin    Suspension 699415 Unit(s) Oral four times a day  ondansetron Injectable 4 milliGRAM(s) IV Push every 6 hours PRN  oxyCODONE    IR 5 milliGRAM(s) Oral every 8 hours PRN  piperacillin/tazobactam IVPB. 3.375 Gram(s) IV Intermittent every 8 hours  polyethylene glycol 3350 17 Gram(s) Oral daily  senna 2 Tablet(s) Oral at bedtime      Vital Signs Last 24 Hrs  T(C): 36.8 (26 Dec 2018 05:10), Max: 36.8 (25 Dec 2018 13:46)  T(F): 98.3 (26 Dec 2018 05:10), Max: 98.3 (25 Dec 2018 13:46)  HR: 94 (26 Dec 2018 05:10) (76 - 106)  BP: 104/76 (26 Dec 2018 05:10) (94/68 - 109/74)  BP(mean): --  RR: 18 (26 Dec 2018 05:10) (18 - 18)  SpO2: 98% (26 Dec 2018 05:10) (98% - 100%)                          11.7   15.19 )-----------( 37       ( 26 Dec 2018 06:07 )             32.4       12-26    143  |  105  |  24<H>  ----------------------------<  137<H>  3.8   |  21<L>  |  0.86    Ca    8.8      26 Dec 2018 06:07    TPro  4.8<L>  /  Alb  2.0<L>  /  TBili  7.8<H>  /  DBili  x   /  AST  777<H>  /  ALT  436<H>  /  AlkPhos  284<H>  12-26
Events since yesterday notes, pt has become more lethargic, on ROS, she is slow to respond but does respond after repeated questions, says she has slight pain in the abdomen, no headache, no nausea or vomiting.         Meds:  dextrose 5% + sodium chloride 0.9%. 1000 milliLiter(s) IV Continuous <Continuous>  enoxaparin Injectable 40 milliGRAM(s) SubCutaneous every 24 hours  influenza   Vaccine 0.5 milliLiter(s) IntraMuscular once  metoclopramide Injectable 10 milliGRAM(s) IV Push every 6 hours PRN  nystatin    Suspension 141735 Unit(s) Oral four times a day  ondansetron Injectable 4 milliGRAM(s) IV Push every 6 hours PRN  oxyCODONE    IR 5 milliGRAM(s) Oral every 8 hours PRN  piperacillin/tazobactam IVPB. 3.375 Gram(s) IV Intermittent every 8 hours  polyethylene glycol 3350 17 Gram(s) Oral daily  senna 2 Tablet(s) Oral at bedtime      Vital Signs Last 24 Hrs  T(C): 36.8 (23 Dec 2018 10:08), Max: 37 (22 Dec 2018 13:37)  T(F): 98.2 (23 Dec 2018 10:08), Max: 98.6 (22 Dec 2018 13:37)  HR: 100 (23 Dec 2018 10:08) (89 - 100)  BP: 101/70 (23 Dec 2018 10:08) (101/70 - 120/76)  BP(mean): --  RR: 18 (23 Dec 2018 10:08) (18 - 18)  SpO2: 95% (23 Dec 2018 10:08) (95% - 98%)                          13.4   13.42 )-----------( 47       ( 23 Dec 2018 06:23 )             37.5       12-23    144  |  102  |  24<H>  ----------------------------<  144<H>  3.9   |  23  |  0.73    Ca    8.7      23 Dec 2018 06:23    TPro  5.3<L>  /  Alb  2.6<L>  /  TBili  7.7<H>  /  DBili  x   /  AST  762<H>  /  ALT  495<H>  /  AlkPhos  330<H>  12-23            MRI brain: Impression: Extra-axial lesion is seen involving left parietal region   which could be compatible meningioma versus dural metastasis.    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    Osseous metastasis as described above.                  HAYLEY MAIER M.D., ATTENDING RADIOLOGIST  This document has been electronically signed. Dec 23 2018  8:29AM    CT a/p: IMPRESSION:     Status post right mastectomy. Numerous hepatic and pulmonary metastases.   Destructive lytic lesion of the L4 vertebral body. Correlation with prior   imaging is recommended.    Myomatous uterus. A heterogeneous low-attenuation area in the fundal   region may be secondary to myomatous involvement versus abnormal   thickened endometrium.. Pelvic and transvaginal ultrasound may be   obtained as clinically indicated.    A mass is noted in the left breast the inner left breast. Correlation   with any prior imaging is suggested..                  KASSY WOODSON M.D., RADIOLOGY FELLOW  This document has been electronically signed.  MERRITT BARNES M.D., ATTENDING RADIOLOGIST  This document has been electronically signed. Dec 22 2018 12:56PM
INTERVAL HPI/OVERNIGHT EVENTS:  Alert and verbal, however grimacing in pain  Confused with instruction to take po medications  1 BT being given now  Total BT in 24 hrs = 3 BT ( 1 oxycodone and 2 IV morphine BT)    Allergies  No Known Allergies  Intolerances    ADVANCE DIRECTIVES:    DNR   MOLST [ ] YES [ ] NO     MEDICATIONS  (STANDING):  dextrose 5% + sodium chloride 0.9%. 1000 milliLiter(s) (75 mL/Hr) IV Continuous <Continuous>  enoxaparin Injectable 40 milliGRAM(s) SubCutaneous every 24 hours  ganciclovir IVPB 400 milliGRAM(s) IV Intermittent every 12 hours  influenza   Vaccine 0.5 milliLiter(s) IntraMuscular once  nystatin    Suspension 490638 Unit(s) Oral four times a day  piperacillin/tazobactam IVPB. 3.375 Gram(s) IV Intermittent every 8 hours  polyethylene glycol 3350 17 Gram(s) Oral daily  senna 2 Tablet(s) Oral at bedtime  sodium phosphate IVPB 15 milliMole(s) IV Intermittent once    MEDICATIONS  (PRN):  bisacodyl Suppository 10 milliGRAM(s) Rectal daily PRN Constipation  metoclopramide Injectable 10 milliGRAM(s) IV Push every 6 hours PRN Nausea/vomiting  morphine  - Injectable 2 milliGRAM(s) IV Push every 4 hours PRN pain  ondansetron Injectable 4 milliGRAM(s) IV Push every 6 hours PRN Nausea and/or Vomiting    PRESENT SYMPTOMS:  Source: [ x] Patient   [ ] Family   [ ] Team     Pain:                        [ ] No [x ] Yes             [ ] Mild [ ] Moderate [ ] Severe    Onset - chronic  Location - mainly RUQ, tenderness is diffuse  Duration - continuous  Character - sharp  Alleviating/Aggravating - pain medication  Radiation -  Timing -    Dyspnea:                [ ] No [ ] Yes             [ ] Mild [ ] Moderate [ ] Severe    Anxiety:                  [ ] No [ ] Yes             [ ] Mild [ ] Moderate [ ] Severe    Fatigue:                  [ ] No [ ] Yes             [ ] Mild [ ] Moderate [ ] Severe    Nausea:                  [ ] No [ ] Yes             [ ] Mild [ ] Moderate [ ] Severe    Loss of appetite:   [ ] No [ ] Yes             [ ] Mild [ ] Moderate [ ] Severe    Constipation:        [ ] No [ x] Yes             [ ] Mild [ ] Moderate [ ] Severe    Other Symptoms:  [x ] All other review of systems negative   [ ] Unable to obtain due to poor mentation     Karnofsky Performance Score/Palliative Performance Status Version 2:     30    %    PHYSICAL EXAM:  Vital Signs Last 24 Hrs  T(C): 36.3 (27 Dec 2018 06:05), Max: 36.6 (26 Dec 2018 15:22)  T(F): 97.3 (27 Dec 2018 06:05), Max: 97.9 (26 Dec 2018 15:22)  HR: 76 (27 Dec 2018 11:05) (76 - 102)  BP: 142/92 (27 Dec 2018 11:05) (102/81 - 142/92)  BP(mean): --  RR: 17 (27 Dec 2018 06:05) (17 - 18)  SpO2: 97% (27 Dec 2018 06:05) (97% - 100%) I&O's Summary    26 Dec 2018 07:01  -  27 Dec 2018 07:00  --------------------------------------------------------  IN: 0 mL / OUT: 100 mL / NET: -100 mL    General:  [ ] Alert  [ ] Oriented x      [x ] Lethargic  [ ] Agitated   [ ] Cachexia   [ ] Unarousable  [x ] Verbal  [ ] Non-Verbal    HEENT:  [ ] Normal   x[ ] Dry mouth   [ ] ET Tube    [ ] Trach  [ ] Oral lesions    Lungs:   [x ] Clear [ ] Tachypnea  [ ] Audible excessive secretions   [ ] Rhonchi        [ ] Right [ ] Left [ ] Bilateral  [ ] Crackles        [ ] Right [ ] Left [ ] Bilateral  [ ] Wheezing     [ ] Right [ ] Left [ ] Bilateral    Cardiovascular:  [x ] Regular [ ] Irregular [ ] Tachycardia   [ ] Bradycardia  [ ] Murmur [ ] Other    Abdomen: [ ] Soft  [ ] Distended   [x ] +BS  [ ] Non tender [x ] Tender  [ ]PEG   [ ]OGT/ NGT   Last BM:   12/22    Genitourinary: [ ] Normal [x ] Incontinent   [ ] Oliguria/Anuria   [ ] Olivares    Musculoskeletal:  [ ] Normal   [ ] Weakness  [ x] Bedbound/Wheelchair bound [ ] Edema    Neurological: [ ] No focal deficits  [x ] Cognitive impairment  [ ] Dysphagia [ ] Dysarthria [ ] Paresis [ ] Other     Skin: [ ] Normal   [ ] Pressure ulcer(s)                  [ ] Rash    LABS:                                          12.7   12.09 )-----------( 31       ( 27 Dec 2018 06:30 )             37.1     12-27    139  |  104  |  27<H>  ----------------------------<  105<H>  4.8   |  16<L>  |  0.85    Ca    8.8      27 Dec 2018 06:30  Phos  2.2     12-27  Mg     2.3     12-27    TPro  5.0<L>  /  Alb  2.0<L>  /  TBili  8.5<H>  /  DBili  6.3<H>  /  AST  642<H>  /  ALT  422<H>  /  AlkPhos  281<H>  12-27      RADIOLOGY & ADDITIONAL STUDIES: no new imaging for review     Shock: [ ] Septic [ ] Cardiogenic [ ] Neurologic [ ] Hypovolemic  Vasopressors x   Inotrophs x     Oral Intake: [ ] Unable/mouth care only [x ] Minimal [ ] Moderate [ ] Full Capability    Diet: [ ] NPO [ ] Tube feeds [ ] TPN [ ] Other     REFERRALS:   [ ] Chaplaincy  [ ] Hospice  [ ] Child Life  [ ] Social Work  [ ] Case management [ ] Holistic Therapy
INTERVAL HPI/OVERNIGHT EVENTS:  Lethargic  2 BT Morphine given in 24 hrs   Morphine changed to 1 mg     Allergies  No Known Allergies  Intolerances    ADVANCE DIRECTIVES:    DNR   MOLST [ ] YES [ ] NO     MEDICATIONS  (STANDING):  dextrose 5% + sodium chloride 0.9%. 1000 milliLiter(s) (75 mL/Hr) IV Continuous <Continuous>  enoxaparin Injectable 40 milliGRAM(s) SubCutaneous every 24 hours  ganciclovir IVPB 400 milliGRAM(s) IV Intermittent every 12 hours  influenza   Vaccine 0.5 milliLiter(s) IntraMuscular once  lactulose Syrup 10 Gram(s) Oral two times a day  nystatin    Suspension 032203 Unit(s) Oral four times a day  piperacillin/tazobactam IVPB. 3.375 Gram(s) IV Intermittent every 8 hours  polyethylene glycol 3350 17 Gram(s) Oral daily  senna 2 Tablet(s) Oral at bedtime    MEDICATIONS  (PRN):  bisacodyl Suppository 10 milliGRAM(s) Rectal daily PRN Constipation  metoclopramide Injectable 10 milliGRAM(s) IV Push every 6 hours PRN Nausea/vomiting  morphine  - Injectable 1 milliGRAM(s) IV Push every 4 hours PRN Severe Pain (7 - 10)  ondansetron Injectable 4 milliGRAM(s) IV Push every 6 hours PRN Nausea and/or Vomiting    PRESENT SYMPTOMS:  Source: [ x] Patient   [ ] Family   [ ] Team     Pain:                        [ ] No [x ] Yes             [ ] Mild [ ] Moderate [ ] Severe    Onset - chronic  Location - mainly RUQ, tenderness is diffuse  Duration - continuous  Character - sharp  Alleviating/Aggravating - pain medication  Radiation -  Timing -    Dyspnea:                [ ] No [ ] Yes             [ ] Mild [ ] Moderate [ ] Severe    Anxiety:                  [ ] No [ ] Yes             [ ] Mild [ ] Moderate [ ] Severe    Fatigue:                  [ ] No [ ] Yes             [ ] Mild [ ] Moderate [ ] Severe    Nausea:                  [ ] No [ ] Yes             [ ] Mild [ ] Moderate [ ] Severe    Loss of appetite:   [ ] No [ ] Yes             [ ] Mild [ ] Moderate [ ] Severe    Constipation:        [ ] No [ x] Yes             [ ] Mild [ ] Moderate [ ] Severe    Other Symptoms:  [x ] All other review of systems negative   [ ] Unable to obtain due to poor mentation     Karnofsky Performance Score/Palliative Performance Status Version 2:     30    %    PHYSICAL EXAM:  Vital Signs Last 24 Hrs  T(C): 36.6 (28 Dec 2018 10:59), Max: 37 (27 Dec 2018 22:18)  T(F): 97.8 (28 Dec 2018 10:59), Max: 98.6 (27 Dec 2018 22:18)  HR: 69 (28 Dec 2018 10:59) (69 - 104)  BP: 122/57 (28 Dec 2018 10:59) (98/62 - 122/57)  BP(mean): --  RR: 18 (28 Dec 2018 10:59) (16 - 18)  SpO2: 100% (28 Dec 2018 10:59) (96% - 100%) I&O's Summary    27 Dec 2018 07:01  -  28 Dec 2018 07:00  --------------------------------------------------------  IN: 0 mL / OUT: 365 mL / NET: -365 mL    28 Dec 2018 07:01  -  28 Dec 2018 14:04  --------------------------------------------------------  IN: 0 mL / OUT: 250 mL / NET: -250 mL    General:  [ ] Alert  [ ] Oriented x      [x ] Lethargic  [ ] Agitated   [ ] Cachexia   [ ] Unarousable  [x ] Verbal  [ ] Non-Verbal    HEENT:  [ ] Normal   x[ ] Dry mouth   [ ] ET Tube    [ ] Trach  [ ] Oral lesions    Lungs:   [x ] Clear [ ] Tachypnea  [ ] Audible excessive secretions   [ ] Rhonchi        [ ] Right [ ] Left [ ] Bilateral  [ ] Crackles        [ ] Right [ ] Left [ ] Bilateral  [ ] Wheezing     [ ] Right [ ] Left [ ] Bilateral    Cardiovascular:  [x ] Regular [ ] Irregular [ ] Tachycardia   [ ] Bradycardia  [ ] Murmur [ ] Other    Abdomen: [ ] Soft  [ ] Distended   [x ] +BS  [ ] Non tender [x ] Tender  [ ]PEG   [ ]OGT/ NGT   Last BM:   12/28    Genitourinary: [ ] Normal [x ] Incontinent   [ ] Oliguria/Anuria   [ ] Olivares    Musculoskeletal:  [ ] Normal   [ ] Weakness  [ x] Bedbound/Wheelchair bound [ ] Edema    Neurological: [ ] No focal deficits  [x ] Cognitive impairment  [ ] Dysphagia [ ] Dysarthria [ ] Paresis [ ] Other     Skin: [ ] Normal   [ ] Pressure ulcer(s)                  [ ] Rash    LABS:                        13.0   12.10 )-----------( 42       ( 28 Dec 2018 06:28 )             36.0     12-28    138  |  101  |  32<H>  ----------------------------<  136<H>  4.1   |  13<L>  |  0.83    Ca    8.8      28 Dec 2018 06:28  Phos  2.6     12-28  Mg     2.3     12-28    TPro  5.0<L>  /  Alb  2.0<L>  /  TBili  9.9<H>  /  DBili  x   /  AST  883<H>  /  ALT  543<H>  /  AlkPhos  284<H>  12-28             RADIOLOGY & ADDITIONAL STUDIES: no new imaging for review     Shock: [ ] Septic [ ] Cardiogenic [ ] Neurologic [ ] Hypovolemic  Vasopressors x   Inotrophs x     Oral Intake: [ ] Unable/mouth care only [x ] Minimal [ ] Moderate [ ] Full Capability    Diet: [ ] NPO [ ] Tube feeds [ ] TPN [ ] Other     REFERRALS:   [ ] Chaplaincy  [ ] Hospice  [ ] Child Life  [ ] Social Work  [ ] Case management [ ] Holistic Therapy
INTERVAL HPI/OVERNIGHT EVENTS:  patient was started on ganciclovir   slight improvement in mental status at the time of my examination  in distress from pain 8/10 of entire abdomen on positioning     Allergies  No Known Allergies  Intolerances    ADVANCE DIRECTIVES:    DNR   MOLST [ ] YES [ ] NO     MEDICATIONS  (STANDING):  dextrose 5% + sodium chloride 0.9%. 1000 milliLiter(s) (75 mL/Hr) IV Continuous <Continuous>  enoxaparin Injectable 40 milliGRAM(s) SubCutaneous every 24 hours  ganciclovir IVPB 400 milliGRAM(s) IV Intermittent every 12 hours  influenza   Vaccine 0.5 milliLiter(s) IntraMuscular once  nystatin    Suspension 719026 Unit(s) Oral four times a day  piperacillin/tazobactam IVPB. 3.375 Gram(s) IV Intermittent every 8 hours  polyethylene glycol 3350 17 Gram(s) Oral daily  senna 2 Tablet(s) Oral at bedtime    MEDICATIONS  (PRN):  bisacodyl Suppository 10 milliGRAM(s) Rectal daily PRN Constipation  metoclopramide Injectable 10 milliGRAM(s) IV Push every 6 hours PRN Nausea/vomiting  morphine  - Injectable 2 milliGRAM(s) IV Push every 4 hours PRN pain  ondansetron Injectable 4 milliGRAM(s) IV Push every 6 hours PRN Nausea and/or Vomiting  oxyCODONE    IR 5 milliGRAM(s) Oral every 8 hours PRN Moderate to Severe Pain      PRESENT SYMPTOMS:  Source: [ x] Patient   [ ] Family   [ ] Team     Pain:                        [ ] No [x ] Yes             [ ] Mild [ ] Moderate [ ] Severe    Onset - chronic  Location - mainly RUQ, tenderness is diffuse  Duration - continuous  Character - sharp  Alleviating/Aggravating - pain medication  Radiation -  Timing -    Dyspnea:                [ ] No [ ] Yes             [ ] Mild [ ] Moderate [ ] Severe    Anxiety:                  [ ] No [ ] Yes             [ ] Mild [ ] Moderate [ ] Severe    Fatigue:                  [ ] No [ ] Yes             [ ] Mild [ ] Moderate [ ] Severe    Nausea:                  [ ] No [ ] Yes             [ ] Mild [ ] Moderate [ ] Severe    Loss of appetite:   [ ] No [ ] Yes             [ ] Mild [ ] Moderate [ ] Severe    Constipation:        [ ] No [ ] Yes             [ ] Mild [ ] Moderate [ ] Severe    Other Symptoms:  [x ] All other review of systems negative   [ ] Unable to obtain due to poor mentation     Karnofsky Performance Score/Palliative Performance Status Version 2:     30    %    PHYSICAL EXAM:  Vital Signs Last 24 Hrs  T(C): 36.8 (26 Dec 2018 05:10), Max: 36.8 (25 Dec 2018 13:46)  T(F): 98.3 (26 Dec 2018 05:10), Max: 98.3 (25 Dec 2018 13:46)  HR: 94 (26 Dec 2018 05:10) (76 - 106)  BP: 104/76 (26 Dec 2018 05:10) (94/68 - 109/74)  BP(mean): --  RR: 18 (26 Dec 2018 05:10) (18 - 18)  SpO2: 98% (26 Dec 2018 05:10) (98% - 100%) I&O's Summary    25 Dec 2018 07:01  -  26 Dec 2018 07:00  --------------------------------------------------------  IN: 150 mL / OUT: 225 mL / NET: -75 mL    General:  [ ] Alert  [ ] Oriented x      [x ] Lethargic  [ ] Agitated   [ ] Cachexia   [ ] Unarousable  [x ] Verbal  [ ] Non-Verbal    HEENT:  [ ] Normal   x[ ] Dry mouth   [ ] ET Tube    [ ] Trach  [ ] Oral lesions    Lungs:   [x ] Clear [ ] Tachypnea  [ ] Audible excessive secretions   [ ] Rhonchi        [ ] Right [ ] Left [ ] Bilateral  [ ] Crackles        [ ] Right [ ] Left [ ] Bilateral  [ ] Wheezing     [ ] Right [ ] Left [ ] Bilateral    Cardiovascular:  [x ] Regular [ ] Irregular [ ] Tachycardia   [ ] Bradycardia  [ ] Murmur [ ] Other    Abdomen: [ ] Soft  [ ] Distended   [x ] +BS  [ ] Non tender [x ] Tender  [ ]PEG   [ ]OGT/ NGT   Last BM:   12/22    Genitourinary: [ ] Normal [x ] Incontinent   [ ] Oliguria/Anuria   [ ] Olivares    Musculoskeletal:  [ ] Normal   [ ] Weakness  [ x] Bedbound/Wheelchair bound [ ] Edema    Neurological: [ ] No focal deficits  [x ] Cognitive impairment  [ ] Dysphagia [ ] Dysarthria [ ] Paresis [ ] Other     Skin: [ ] Normal   [ ] Pressure ulcer(s)                  [ ] Rash    LABS:                        11.7   15.19 )-----------( 37       ( 26 Dec 2018 06:07 )             32.4     12-26    143  |  105  |  24<H>  ----------------------------<  137<H>  3.8   |  21<L>  |  0.86    Ca    8.8      26 Dec 2018 06:07    TPro  4.8<L>  /  Alb  2.0<L>  /  TBili  7.8<H>  /  DBili  x   /  AST  777<H>  /  ALT  436<H>  /  AlkPhos  284<H>  12-26      RADIOLOGY & ADDITIONAL STUDIES:    EXAM:  MR BRAIN IC      Impression: Extra-axial lesion is seen involving left parietal region   which could be compatible meningioma versus dural metastasis.    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    Osseous metastasis as described above.      Shock: [ ] Septic [ ] Cardiogenic [ ] Neurologic [ ] Hypovolemic  Vasopressors x   Inotrophs x     Oral Intake: [ ] Unable/mouth care only [ ] Minimal [ ] Moderate [ ] Full Capability    Diet: [ ] NPO [ ] Tube feeds [ ] TPN [ ] Other     REFERRALS:   [ ] Chaplaincy  [ ] Hospice  [ ] Child Life  [ ] Social Work  [ ] Case management [ ] Holistic Therapy
Infectious Diseases progress note:    Subjective:  Events noted.  Several family members at bedside, including daughter and son.  As per daughter, family wants to "exhaust all possible options".   Pt with decreased appetite.  No cough, spitting up phlegm from time to time.  Constipated.      ROS:  Nonverbal.    Allergies    No Known Allergies    Intolerances        ANTIBIOTICS/RELEVANT:  antimicrobials  nystatin    Suspension 978361 Unit(s) Oral four times a day  piperacillin/tazobactam IVPB. 3.375 Gram(s) IV Intermittent every 8 hours    immunologic:  influenza   Vaccine 0.5 milliLiter(s) IntraMuscular once    OTHER:  bisacodyl Suppository 10 milliGRAM(s) Rectal daily PRN  dextrose 5% + sodium chloride 0.9%. 1000 milliLiter(s) IV Continuous <Continuous>  enoxaparin Injectable 40 milliGRAM(s) SubCutaneous every 24 hours  metoclopramide Injectable 10 milliGRAM(s) IV Push every 6 hours PRN  ondansetron Injectable 4 milliGRAM(s) IV Push every 6 hours PRN  oxyCODONE    IR 5 milliGRAM(s) Oral every 8 hours PRN  polyethylene glycol 3350 17 Gram(s) Oral daily  senna 2 Tablet(s) Oral at bedtime      Objective:  Vital Signs Last 24 Hrs  T(C): 36.5 (24 Dec 2018 13:03), Max: 36.7 (24 Dec 2018 09:08)  T(F): 97.7 (24 Dec 2018 13:03), Max: 98 (24 Dec 2018 09:08)  HR: 90 (24 Dec 2018 13:03) (85 - 98)  BP: 103/66 (24 Dec 2018 13:03) (101/79 - 111/68)  BP(mean): --  RR: 17 (24 Dec 2018 13:03) (17 - 18)  SpO2: 99% (24 Dec 2018 13:03) (96% - 100%)    PHYSICAL EXAM:  Constitutional: appears weak, eyes closed  Eyes:BALBIR, EOMI  Ear/Nose/Throat: no thrush, mucositis.  Moist mucous membranes	  Neck:no JVD, no lymphadenopathy, supple  Respiratory: CTA delores  Cardiovascular: S1S2 RRR, no murmurs  Gastrointestinal:soft, nontender,  nondistended (+) BS  Extremities:no e/e/c  Skin:  no rashes, open wounds or ulcerations        LABS:                        13.4   13.42 )-----------( 47       ( 23 Dec 2018 06:23 )             37.5     12-24    142  |  104  |  20  ----------------------------<  127<H>  3.8   |  20<L>  |  0.69    Ca    8.6      24 Dec 2018 05:49    TPro  5.0<L>  /  Alb  2.4<L>  /  TBili  8.5<H>  /  DBili  x   /  AST  882<H>  /  ALT  505<H>  /  AlkPhos  319<H>  12-24            MICROBIOLOGY:    Culture - Blood (12.22.18 @ 06:03)    Culture - Blood:   NO ORGANISMS ISOLATED  NO ORGANISMS ISOLATED AT 48 HRS.    Specimen Source: BLOOD PERIPHERAL    Culture - Blood (12.21.18 @ 13:20)    Culture - Blood:   NO ORGANISMS ISOLATED  NO ORGANISMS ISOLATED AT 72 HRS.    Specimen Source: BLOOD PERIPHERAL    Culture - Urine (12.21.18 @ 10:55)    -  Amikacin: S <=8 RENETTA    -  Ampicillin: R >16 RENETTA    -  Ampicillin/Sulbactam: S <=4/2 RENETTA    -  Aztreonam: S <=4 RENETTA    -  Cefazolin: S <=2 RENETTA    -  Cefepime: S <=2 RENETTA    -  Cefoxitin: S <=4 RENETTA    -  Ceftazidime: S <=1 RENETTA    -  Ceftriaxone: S <=1 RENETTA    -  Ciprofloxacin: S <=0.5 RENETTA    -  Ertapenem: S <=0.5 RENETTA    -  Gentamicin: S <=1 RENETTA    -  Imipenem: S <=1 RENETTA    -  Levofloxacin: S <=1 RENETTA    -  Meropenem: S <=1 RENETTA    -  Nitrofurantoin: I 64 RENETTA    -  Piperacillin/Tazobactam: S <=8 RENETTA    -  Tigecycline: S    -  Tobramycin: S <=2 RENETTA    -  Trimethoprim/Sulfamethoxazole: S <=0.5/9.5 RENETTA    Culture - Urine:   COLONY COUNT: > = 100,000 CFU/ML    Specimen Source: URINE MIDSTREAM    Organism Identification: Klebsiella pneumoniae    Organism: Klebsiella pneumoniae    Method Type: NEGATIVE RENETTA 43    Culture - Blood (12.20.18 @ 19:12)    -  Piperacillin/Tazobactam: S <=8 RENETTA    -  Meropenem: S <=1 RENETTA    -  Levofloxacin: S <=1 RENETTA    -  Imipenem: S <=1 RENETTA    -  Gentamicin: S <=1 RENETTA    -  Ertapenem: S <=0.5 RENETTA    -  Ciprofloxacin: S <=0.5 RENETTA    -  Ceftriaxone: S <=1 RENETTA    -  Ceftazidime: S <=1 RENETTA    -  Cefoxitin: S 8 RENETTA    -  Cefepime: S <=2 RENETTA    -  Cefazolin: S <=2 RENETTA    -  Aztreonam: S <=4 RENETTA    -  Ampicillin/Sulbactam: S <=4/2 RENETTA    -  Ampicillin: S 4 RENETTA    -  Amikacin: S <=8 RENETTA    -  Trimethoprim/Sulfamethoxazole: S <=0.5/9.5 RENETTA    -  Tobramycin: S <=2 RENETTA    -  Tigecycline: S <=1 RENETTA    Culture - Blood:   ***Blood Panel PCR results on this specimen are available  approximately 3 hours after the Gram stain result***  Gram stain, PCR, and/or culture results may not always  correspond due to difference in methodologies  ------------------------------------------------------------  This PCR assay was performed using [employer].  The  following targets are tested for:  Enterococcus, vancomycin  resistant enterococci, Listeria monocytogenes,  coagulase  negative staphylococci, S. aureus, methicillin resistant S.  aureus, Streptococcus agalactiae (Group B), S. pneumoniae,  S. pyogenes (Group A), Acinetobacter baumannii, Enterobacter  cloacae, E. coli, Klebsiella oxytoca, K. pneumoniae, Proteus  sp., Serratia marcescens, Haemophilus influenzae, Neisseria  meningitidis, Pseudomonas aeruginosa, Candida albicans, C.  glabrata, C. krusei, C. parapsilosis, C. tropicalis and the  KPC resistance gene.  **NOTE: Due to technical problems, Proteus sp. will NOT be  reported as part of the BCID paneluntil further notice.    -  Escherichia coli: + DETECT RENETTA    Specimen Source: BLOOD VENOUS    Organism: BLOOD CULTURE PCR    Organism: Escherichia coli    Gram Stain Blood:   ***** CRITICAL RESULT *****  PERSON CALLED / READ-BACK: ELISE FINNEGAN [place]  DATE / TIME CALLED: 12/21/18 0642  CALLED BY: JORGE LO^Gram Neg Rods  AFTER: 10 HOURS INCUBATION  BOTTLE: AEROBIC   ANAEROBIC BOTTLES    Organism Identification: BLOOD CULTURE PCR  Escherichia coli    Method Type: PCR    Method Type: NEGATIVE RENETTA 43    Culture - Blood (12.20.18 @ 19:12)    -  Piperacillin/Tazobactam: S <=8 RENETTA    -  Meropenem: S <=1 RENETTA    -  Levofloxacin: S <=1 RENETTA    -  Imipenem: S <=1 RENETTA    -  Gentamicin: S <=1 RENETTA    -  Ertapenem: S <=0.5 RENETTA    -  Ciprofloxacin: S <=0.5 RENETTA    -  Ceftriaxone: S <=1 RENETTA    -  Ceftazidime: S <=1 RENETTA    -  Cefoxitin: S 8 RENETTA    -  Cefepime: S <=2 RENETTA    -  Cefazolin: S <=2 RENETTA    -  Aztreonam: S <=4 RENETTA    -  Ampicillin/Sulbactam: S <=4/2 RENETTA    -  Ampicillin: S 4 RENETTA    -  Amikacin: S <=8 RENETTA    -  Trimethoprim/Sulfamethoxazole: S <=0.5/9.5 RENETTA    -  Tobramycin: S <=2 RENETTA    -  Tigecycline: S <=1 RENETTA    Culture - Blood:   ***Blood Panel PCR results on this specimen are available  approximately 3 hours after the Gram stain result***  Gram stain, PCR, and/or culture results may not always  correspond due to difference in methodologies  ------------------------------------------------------------  This PCR assay was performed using [employer].  The  following targets are tested for:  Enterococcus, vancomycin  resistant enterococci, Listeria monocytogenes,  coagulase  negative staphylococci, S. aureus, methicillin resistant S.  aureus, Streptococcus agalactiae (Group B), S. pneumoniae,  S. pyogenes (Group A), Acinetobacter baumannii, Enterobacter  cloacae, E. coli, Klebsiella oxytoca, K. pneumoniae, Proteus  sp., Serratia marcescens, Haemophilus influenzae, Neisseria  meningitidis, Pseudomonas aeruginosa, Candida albicans, C.  glabrata, C. krusei, C. parapsilosis, C. tropicalis and the  KPC resistance gene.  **NOTE: Due to technical problems, Proteus sp. will NOT be  reported as part of the BCID paneluntil further notice.    -  Escherichia coli: + DETECT RENETTA    Specimen Source: BLOOD VENOUS    Organism: BLOOD CULTURE PCR    Organism: Escherichia coli    Gram Stain Blood:   ***** CRITICAL RESULT *****  PERSON CALLED / READ-BACK: ELISE FINNEGAN  DATE / TIME CALLED: 12/21/18 0642  CALLED BY: JORGE LO^Gram Neg Rods  AFTER: 10 HOURS INCUBATION  BOTTLE: AEROBIC   ANAEROBIC BOTTLES    Organism Identification: BLOOD CULTURE PCR  Escherichia coli    Method Type: PCR    Method Type: NEGATIVE RENETTA 43          RADIOLOGY & ADDITIONAL STUDIES:    < from: MR Head w/ IV Cont (12.23.18 @ 02:52) >  INTERPRETATION:  History: Breast cancer with brain metastasis. Patient   presents with altered mental status.    MRI the brain was performed using sagittal T1, axial T 1 fast spin-echo   T2-weighted sequence of FLAIR diffusion and susceptibility weighted   sequence. The patient was injected with approximately 7.5 cc of Gadavist   IV and sagittal coronal and axial T1-weighted sequences were performed.    No prior studies available for comparison.    Parenchymal volume loss is seen.    There is evidence of a enhancing extra-axial lesion seen involving the   medial left parietal region. This is best seen on series 17 image 18 and   does demonstrate small amount of surrounding edema. This lesion measures   approximately 1.0 x 0.4 cm and could be compatible with a meningioma   versus dural metastasis. No definite compromise of the adjacent superior   sagittal sinus is seen though MR venogram can be done for further   evaluation.    Small enhancing lesion is seen involving the high right parietal cortex.   This is best seen on series 17 image 21 and measures approximately 0.4   cm. This lesion is suspicious for leptomeningeal metastasis given the   lack of surrounding edema.    Small enhancing lesion is seen involving the high right right precentral   gyrus region. This best seen on series 17 image 24 and measures   approximately 0.6 cm widest diameter. This lesion is also suspicious with   a meningioma metastasis given the lack of surrounding edema.    No significant shift or herniation is seen.    Nonspecific areas of abnormal T2 prolongation in the left cerebellar   region are identified. This finding could be compatible with areas of   ischemia versus artifact though clinical correlation continued close and   false recommended.    Nonspecific smooth symmetric bilateral dural enhancement is identified.   This finding could be compatible with a prior procedure such as prior   lumbar puncture. Please correlate clinically.    There are no abnormal intra or extra-axial collections seen.    The large vessels demonstrate normal flow voids.    There is evidence of abnormal T1 prolongation with associated enhancement   seen involving the high bilateral parietal calvarium. This is likely   compatible with osseous metastasis given patient's history.    Prominent extra calvarial soft tissue is identified in the high bilateral   parietal region.    Impression: Extra-axial lesion is seen involving left parietal region   which could be compatible meningioma versus dural metastasis.    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    Osseous metastasis as described above.    < end of copied text >
Infectious Diseases progress note:    Subjective:  Pt lethargic, getting morphine for pain.  No new fevers.  LFTs remain elevated.  Daughter at bedside.  States she still has hope and "my light at the end of the tunnel is that the virus gets treated, and my mom can start chemo in a few weeks".      ROS:  nonverbal, unable to assess    Allergies    No Known Allergies    Intolerances        ANTIBIOTICS/RELEVANT:  antimicrobials  ganciclovir IVPB 400 milliGRAM(s) IV Intermittent every 12 hours  nystatin    Suspension 320466 Unit(s) Oral four times a day  piperacillin/tazobactam IVPB. 3.375 Gram(s) IV Intermittent every 8 hours    immunologic:  influenza   Vaccine 0.5 milliLiter(s) IntraMuscular once    OTHER:  ALBUTerol/ipratropium for Nebulization 3 milliLiter(s) Nebulizer every 6 hours  bisacodyl Suppository 10 milliGRAM(s) Rectal daily PRN  dextrose 5% + sodium chloride 0.9%. 1000 milliLiter(s) IV Continuous <Continuous>  metoclopramide Injectable 10 milliGRAM(s) IV Push every 6 hours PRN  morphine  - Injectable 1 milliGRAM(s) IV Push every 4 hours PRN  ondansetron Injectable 4 milliGRAM(s) IV Push every 6 hours PRN  polyethylene glycol 3350 17 Gram(s) Oral daily  senna 2 Tablet(s) Oral at bedtime      Objective:  Vital Signs Last 24 Hrs  T(C): 36.4 (31 Dec 2018 13:27), Max: 36.4 (31 Dec 2018 13:27)  T(F): 97.6 (31 Dec 2018 13:27), Max: 97.6 (31 Dec 2018 13:27)  HR: 102 (31 Dec 2018 13:27) (94 - 113)  BP: 116/83 (31 Dec 2018 13:27) (98/69 - 116/83)  BP(mean): --  RR: 17 (31 Dec 2018 13:27) (17 - 17)  SpO2: 99% (31 Dec 2018 13:27) (96% - 99%)    PHYSICAL EXAM:  Constitutional: lethargic  Eyes:BALBIR, EOMI  Ear/Nose/Throat: no thrush, mucositis.  Moist mucous membranes	  Neck:no JVD, no lymphadenopathy, supple  Respiratory: CTA delores  Cardiovascular: S1S2 RRR, no murmurs  Gastrointestinal:soft, nontender,  nondistended (+) BS  Extremities:no e/e/c  Skin:  no rashes, open wounds or ulcerations        LABS:                        12.3   12.69 )-----------( 36       ( 30 Dec 2018 04:50 )             35.0     12-30    141  |  105  |  45<H>  ----------------------------<  120<H>  4.0   |  11<L>  |  1.12    Ca    9.0      30 Dec 2018 04:50  Phos  3.0     12-30  Mg     2.4     12-30    TPro  5.1<L>  /  Alb  2.0<L>  /  TBili  14.0<H>  /  DBili  x   /  AST  1068<H>  /  ALT  687<H>  /  AlkPhos  304<H>  12-30                MICROBIOLOGY:    Culture - Blood (12.22.18 @ 06:03)    Culture - Blood:   NO ORGANISMS ISOLATED    Specimen Source: BLOOD PERIPHERAL    Culture - Blood (12.21.18 @ 13:20)    Culture - Blood:   NO ORGANISMS ISOLATED    Specimen Source: BLOOD PERIPHERAL    Culture - Urine (12.21.18 @ 10:55)    -  Amikacin: S <=8 RENETTA    -  Tobramycin: S <=2 RENETTA    -  Trimethoprim/Sulfamethoxazole: S <=0.5/9.5 RENETTA    Culture - Urine:   COLONY COUNT: > = 100,000 CFU/ML    -  Ampicillin: R >16 RENETTA    -  Ampicillin/Sulbactam: S <=4/2 RENETTA    -  Aztreonam: S <=4 RENETTA    -  Cefazolin: S <=2 RENETTA    -  Cefepime: S <=2 RENETTA    -  Cefoxitin: S <=4 RENETTA    -  Ceftazidime: S <=1 RENETTA    -  Ceftriaxone: S <=1 RENETTA    -  Ciprofloxacin: S <=0.5 RENETTA    -  Ertapenem: S <=0.5 RENETTA    -  Gentamicin: S <=1 RENETTA    -  Imipenem: S <=1 RENETTA    -  Levofloxacin: S <=1 RENETTA    -  Meropenem: S <=1 RENETTA    -  Nitrofurantoin: I 64 RENETTA    -  Piperacillin/Tazobactam: S <=8 RENETTA    -  Tigecycline: S    Specimen Source: URINE MIDSTREAM    Organism Identification: Klebsiella pneumoniae    Organism: Klebsiella pneumoniae    Method Type: NEGATIVE RENETTA 43          RADIOLOGY & ADDITIONAL STUDIES:
Infectious Diseases progress note:    Subjective: Pt having family meeting.  No new fevers reported or acute o/n events.  Lactate continues to rise.   Pt started on lactulose for hepatic encephalopathy, ammonia elevated.      ROS:  Minimally verbal, unable to assess    Allergies    No Known Allergies    Intolerances        ANTIBIOTICS/RELEVANT:  antimicrobials  ganciclovir IVPB 400 milliGRAM(s) IV Intermittent every 12 hours  nystatin    Suspension 550758 Unit(s) Oral four times a day  piperacillin/tazobactam IVPB. 3.375 Gram(s) IV Intermittent every 8 hours    immunologic:  influenza   Vaccine 0.5 milliLiter(s) IntraMuscular once    OTHER:  bisacodyl Suppository 10 milliGRAM(s) Rectal daily PRN  dextrose 5% + sodium chloride 0.9%. 1000 milliLiter(s) IV Continuous <Continuous>  enoxaparin Injectable 40 milliGRAM(s) SubCutaneous every 24 hours  lactulose Syrup 10 Gram(s) Oral two times a day  metoclopramide Injectable 10 milliGRAM(s) IV Push every 6 hours PRN  morphine  - Injectable 1 milliGRAM(s) IV Push every 4 hours PRN  ondansetron Injectable 4 milliGRAM(s) IV Push every 6 hours PRN  polyethylene glycol 3350 17 Gram(s) Oral daily  senna 2 Tablet(s) Oral at bedtime      Objective:  Vital Signs Last 24 Hrs  T(C): 36.6 (28 Dec 2018 10:59), Max: 37 (27 Dec 2018 22:18)  T(F): 97.8 (28 Dec 2018 10:59), Max: 98.6 (27 Dec 2018 22:18)  HR: 69 (28 Dec 2018 10:59) (69 - 104)  BP: 122/57 (28 Dec 2018 10:59) (98/62 - 122/57)  BP(mean): --  RR: 18 (28 Dec 2018 10:59) (16 - 18)  SpO2: 100% (28 Dec 2018 10:59) (96% - 100%)    PHYSICAL EXAM:  Constitutional: eyes closed, minimally verbal  Eyes:BALBIR, EOMI  Ear/Nose/Throat: no thrush, mucositis.  Moist mucous membranes	  Neck:no JVD, no lymphadenopathy, supple  Respiratory: CTA delores  Cardiovascular: S1S2 RRR, no murmurs  Gastrointestinal:soft, nontender,  nondistended (+) BS  Extremities:no e/e/c  Skin:  no rashes, open wounds or ulcerations        LABS:                        13.0   12.10 )-----------( 42       ( 28 Dec 2018 06:28 )             36.0     12-28    138  |  101  |  32<H>  ----------------------------<  136<H>  4.1   |  13<L>  |  0.83    Ca    8.8      28 Dec 2018 06:28  Phos  2.6     12-28  Mg     2.3     12-28    TPro  5.0<L>  /  Alb  2.0<L>  /  TBili  9.9<H>  /  DBili  x   /  AST  883<H>  /  ALT  543<H>  /  AlkPhos  284<H>  12-28                            MICROBIOLOGY:      Culture - Blood (12.22.18 @ 06:03)    Culture - Blood:   NO ORGANISMS ISOLATED    Specimen Source: BLOOD PERIPHERAL    Culture - Blood (12.21.18 @ 13:20)    Culture - Blood:   NO ORGANISMS ISOLATED    Specimen Source: BLOOD PERIPHERAL    Culture - Urine (12.21.18 @ 10:55)    -  Amikacin: S <=8 RENETTA    -  Ampicillin: R >16 RENETTA    -  Ampicillin/Sulbactam: S <=4/2 RENETTA    -  Aztreonam: S <=4 RENETTA    -  Cefazolin: S <=2 RENETTA    -  Cefepime: S <=2 RENETTA    -  Trimethoprim/Sulfamethoxazole: S <=0.5/9.5 RENETTA    Culture - Urine:   COLONY COUNT: > = 100,000 CFU/ML    -  Meropenem: S <=1 RENETTA    -  Nitrofurantoin: I 64 RENETTA    -  Piperacillin/Tazobactam: S <=8 RENETTA    -  Tigecycline: S    -  Tobramycin: S <=2 RENETTA    -  Cefoxitin: S <=4 RENETTA    -  Ceftazidime: S <=1 RENETTA    -  Ceftriaxone: S <=1 RENETTA    -  Ciprofloxacin: S <=0.5 RENETTA    -  Ertapenem: S <=0.5 RENETTA    -  Gentamicin: S <=1 RENETTA    -  Imipenem: S <=1 RENETTA    -  Levofloxacin: S <=1 RENETTA    Specimen Source: URINE MIDSTREAM    Organism Identification: Klebsiella pneumoniae    Organism: Klebsiella pneumoniae    Method Type: NEGATIVE RENETTA 43        RADIOLOGY & ADDITIONAL STUDIES:    < from: MR Head w/ IV Cont (12.23.18 @ 02:52) >  EXAM:  MR BRAIN IC        PROCEDURE DATE:  Dec 23 2018         INTERPRETATION:  History: Breast cancer with brain metastasis. Patient   presents with altered mental status.    MRI the brain was performed using sagittal T1, axial T 1 fast spin-echo   T2-weighted sequence of FLAIR diffusion and susceptibility weighted   sequence. The patient was injected with approximately 7.5 cc of Gadavist   IV and sagittal coronal and axial T1-weighted sequences were performed.    No prior studies available for comparison.    Parenchymal volume loss is seen.    There is evidence of a enhancing extra-axial lesion seen involving the   medial left parietal region. This is best seen on series 17 image 18 and   does demonstrate small amount of surrounding edema. This lesion measures   approximately 1.0 x 0.4 cm and could be compatible with a meningioma   versus dural metastasis. No definite compromise of the adjacent superior   sagittal sinus is seen though MR venogram can be done for further   evaluation.    Small enhancing lesion is seen involving the high right parietal cortex.   This is best seen on series 17 image 21 and measures approximately 0.4   cm. This lesion is suspicious for leptomeningeal metastasis given the   lack of surrounding edema.    Small enhancing lesion is seen involving the high right right precentral   gyrus region. This best seen on series 17 image 24 and measures   approximately 0.6 cm widest diameter. This lesion is also suspicious with   a meningioma metastasis given the lack of surrounding edema.    No significant shift or herniation is seen.    Nonspecific areas of abnormal T2 prolongation in the left cerebellar   region are identified. This finding could be compatible with areas of   ischemia versus artifact though clinical correlation continued close and   false recommended.    Nonspecific smooth symmetric bilateral dural enhancement is identified.   This finding could be compatible with a prior procedure such as prior   lumbar puncture. Please correlate clinically.    There are no abnormal intra or extra-axial collections seen.    The large vessels demonstrate normal flow voids.    There is evidence of abnormal T1 prolongation with associated enhancement   seen involving the high bilateral parietal calvarium. This is likely   compatible with osseous metastasis given patient's history.    Prominent extra calvarial soft tissue is identified in the high bilateral   parietal region.    Impression: Extra-axial lesion is seen involving left parietal region   which could be compatible meningioma versus dural metastasis.    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    Osseous metastasis as described above.    < end of copied text >
Patient is a 63y old  Female who presents with a chief complaint of Weakness (01 Jan 2019 15:45)      SUBJECTIVE / OVERNIGHT EVENTS:  Agonal respiration with non verbal attitude.  Review of Systems: No verbal    MEDICATIONS  (STANDING):  ALBUTerol/ipratropium for Nebulization 3 milliLiter(s) Nebulizer every 6 hours  dextrose 5% + sodium chloride 0.9%. 1000 milliLiter(s) (75 mL/Hr) IV Continuous <Continuous>  ganciclovir IVPB 400 milliGRAM(s) IV Intermittent every 12 hours  influenza   Vaccine 0.5 milliLiter(s) IntraMuscular once  nystatin    Suspension [id number] Unit(s) Oral four times a day  piperacillin/tazobactam IVPB. 3.375 Gram(s) IV Intermittent every 8 hours  polyethylene glycol 3350 17 Gram(s) Oral daily  senna 2 Tablet(s) Oral at bedtime    MEDICATIONS  (PRN):  bisacodyl Suppository 10 milliGRAM(s) Rectal daily PRN Constipation  glycopyrrolate Injectable 0.4 milliGRAM(s) IV Push every 6 hours PRN upper airway secretions  LORazepam   Injectable 0.5 milliGRAM(s) IV Push every 4 hours PRN agitation / anxiety  metoclopramide Injectable 10 milliGRAM(s) IV Push every 6 hours PRN Nausea/vomiting  morphine  - Injectable 1 milliGRAM(s) IV Push every 2 hours PRN sob/ labored breathing  morphine  - Injectable 1 milliGRAM(s) IV Push every 4 hours PRN Severe Pain (7 - 10)  ondansetron Injectable 4 milliGRAM(s) IV Push every 6 hours PRN Nausea and/or Vomiting      PHYSICAL EXAM:  Vital Signs Last 24 Hrs  T(C): 36.5 (01 Jan 2019 13:40), Max: 36.6 (01 Jan 2019 08:17)  T(F): 97.7 (01 Jan 2019 13:40), Max: 97.8 (01 Jan 2019 08:17)  HR: 98 (01 Jan 2019 16:26) (96 - 114)  BP: 88/56 (01 Jan 2019 16:26) (88/56 - 117/76)  BP(mean): --  RR: 26 (01 Jan 2019 16:26) (18 - 26)  SpO2: 94% (01 Jan 2019 16:26) (94% - 99%)  I&O's Summary    GENERAL: NAD, well-developed  HEAD:  Atraumatic, Normocephalic  EYES: EOMI, PERRLA, conjunctiva and sclera clear  NECK: Supple, No JVD  CHEST/LUNG: Clear to auscultation bilaterally; No wheeze. Agonal respiration  HEART: Regular rate and rhythm; No murmurs, rubs, or gallops  ABDOMEN: Soft, Nontender, Nondistended; Bowel sounds present  EXTREMITIES:  2+ Peripheral Pulses, No clubbing, cyanosis, or edema  PSYCH: Non verbal  NEUROLOGY: non-focal  SKIN: No rashes or lesions    LABS:  CAPILLARY BLOOD GLUCOSE                              11.7   9.86  )-----------( 33       ( 01 Jan 2019 07:40 )             33.7     01-01    141  |  106  |  56<H>  ----------------------------<  130<H>  5.8<H>   |  7<LL>  |  1.21    Ca    9.7      01 Jan 2019 07:40  Phos  4.0     01-01  Mg     2.5     01-01    TPro  4.8<L>  /  Alb  1.8<L>  /  TBili  15.2<H>  /  DBili  9.9<H>  /  AST  490<H>  /  ALT  450<H>  /  AlkPhos  242<H>  01-01              RADIOLOGY & ADDITIONAL TESTS:    Imaging Personally Reviewed:    Consultant(s) Notes Reviewed:      Care Discussed with Consultants/Other Providers:
Patient is a 63y old  Female who presents with a chief complaint of Weakness (21 Dec 2018 12:09)      SUBJECTIVE / OVERNIGHT EVENTS:  Febrile, lethargic. C/o pain across her upper abdomen  Review of Systems:   CONSTITUTIONAL: No fever, weight loss, or fatigue  EYES: No eye pain, visual disturbances, or discharge  ENMT:  No difficulty hearing, tinnitus, vertigo; No sinus or throat pain  NECK: No pain or stiffness  BREASTS: No pain, masses, or nipple discharge  RESPIRATORY: No cough, wheezing, chills or hemoptysis; No shortness of breath  CARDIOVASCULAR: No chest pain, palpitations, dizziness, or leg swelling  GASTROINTESTINAL: No abdominal or epigastric pain. No nausea, vomiting, or hematemesis; No diarrhea or constipation. No melena or hematochezia.  GENITOURINARY: No dysuria, frequency, hematuria, or incontinence  NEUROLOGICAL: No headaches, memory loss, loss of strength, numbness, or tremors  SKIN: No itching, burning, rashes, or lesions   LYMPH NODES: No enlarged glands  ENDOCRINE: No heat or cold intolerance; No hair loss  MUSCULOSKELETAL: No joint pain or swelling; No muscle, back, or extremity pain  PSYCHIATRIC: No depression, anxiety, mood swings, or difficulty sleeping  HEME/LYMPH: No easy bruising, or bleeding gums  ALLERY AND IMMUNOLOGIC: No hives or eczema    MEDICATIONS  (STANDING):  enoxaparin Injectable 40 milliGRAM(s) SubCutaneous every 24 hours  influenza   Vaccine 0.5 milliLiter(s) IntraMuscular once  nystatin    Suspension 064662 Unit(s) Oral four times a day  piperacillin/tazobactam IVPB. 3.375 Gram(s) IV Intermittent every 8 hours  polyethylene glycol 3350 17 Gram(s) Oral daily  senna 2 Tablet(s) Oral at bedtime    MEDICATIONS  (PRN):  metoclopramide Injectable 10 milliGRAM(s) IV Push every 6 hours PRN Nausea/vomiting  ondansetron Injectable 4 milliGRAM(s) IV Push every 6 hours PRN Nausea and/or Vomiting  oxyCODONE    IR 5 milliGRAM(s) Oral every 8 hours PRN Moderate to Severe Pain      PHYSICAL EXAM:  Vital Signs Last 24 Hrs  T(C): 36.6 (21 Dec 2018 18:00), Max: 36.8 (21 Dec 2018 00:30)  T(F): 97.9 (21 Dec 2018 18:00), Max: 98.3 (21 Dec 2018 00:30)  HR: 79 (21 Dec 2018 18:00) (70 - 89)  BP: 120/76 (21 Dec 2018 18:00) (114/80 - 133/90)  BP(mean): --  RR: 18 (21 Dec 2018 18:00) (17 - 118)  SpO2: 98% (21 Dec 2018 18:00) (98% - 100%)  I&O's Summary    GENERAL: NAD, well-developed  HEAD:  Atraumatic, Normocephalic  EYES: EOMI, PERRLA, conjunctiva and sclera clear  NECK: Supple, No JVD  CHEST/LUNG: Clear to auscultation bilaterally; No wheeze  HEART: Regular rate and rhythm; No murmurs, rubs, or gallops  ABDOMEN: Soft, Nontender, Nondistended; Bowel sounds present  EXTREMITIES:  2+ Peripheral Pulses, No clubbing, cyanosis, or edema  PSYCH: AAOx3  NEUROLOGY: non-focal  SKIN: No rashes or lesions    LABS:  CAPILLARY BLOOD GLUCOSE                              14.7   20.71 )-----------( 91       ( 20 Dec 2018 18:49 )             42.1     12-20    140  |  97<L>  |  31<H>  ----------------------------<  185<H>  4.9   |  17<L>  |  0.75    Ca    10.0      20 Dec 2018 18:49    TPro  6.5  /  Alb  3.1<L>  /  TBili  5.2<H>  /  DBili  3.6<H>  /  AST  426<H>  /  ALT  475<H>  /  AlkPhos  424<H>  12-20          Urinalysis Basic - ( 21 Dec 2018 09:45 )    Color: DARK YELLOW / Appearance: Lt TURBID / S.025 / pH: 6.0  Gluc: NEGATIVE / Ketone: TRACE  / Bili: TRACE / Urobili: MODERATE   Blood: TRACE / Protein: 20 / Nitrite: NEGATIVE   Leuk Esterase: SMALL / RBC: 6-10 / WBC 6-10   Sq Epi: FEW / Non Sq Epi: x / Bacteria: MODERATE        RADIOLOGY & ADDITIONAL TESTS:    Imaging Personally Reviewed:    Consultant(s) Notes Reviewed:      Care Discussed with Consultants/Other Providers:
Patient is a 63y old  Female who presents with a chief complaint of Weakness (23 Dec 2018 11:27)      SUBJECTIVE / OVERNIGHT EVENTS:  Remains altered mentally  Review of Systems:   CONSTITUTIONAL: No fever, weight loss, or fatigue  EYES: No eye pain, visual disturbances, or discharge  ENMT:  No difficulty hearing, tinnitus, vertigo; No sinus or throat pain  NECK: No pain or stiffness  BREASTS: No pain, masses, or nipple discharge  RESPIRATORY: No cough, wheezing, chills or hemoptysis; No shortness of breath  CARDIOVASCULAR: No chest pain, palpitations, dizziness, or leg swelling  GASTROINTESTINAL: No abdominal or epigastric pain. No nausea, vomiting, or hematemesis; No diarrhea or constipation. No melena or hematochezia.  GENITOURINARY: No dysuria, frequency, hematuria, or incontinence  NEUROLOGICAL: No headaches, memory loss, loss of strength, numbness, or tremors  SKIN: No itching, burning, rashes, or lesions   LYMPH NODES: No enlarged glands  ENDOCRINE: No heat or cold intolerance; No hair loss  MUSCULOSKELETAL: No joint pain or swelling; No muscle, back, or extremity pain  PSYCHIATRIC: Non verbal  HEME/LYMPH: No easy bruising, or bleeding gums  ALLERY AND IMMUNOLOGIC: No hives or eczema    MEDICATIONS  (STANDING):  dextrose 5% + sodium chloride 0.9%. 1000 milliLiter(s) (50 mL/Hr) IV Continuous <Continuous>  enoxaparin Injectable 40 milliGRAM(s) SubCutaneous every 24 hours  influenza   Vaccine 0.5 milliLiter(s) IntraMuscular once  nystatin    Suspension 138644 Unit(s) Oral four times a day  piperacillin/tazobactam IVPB. 3.375 Gram(s) IV Intermittent every 8 hours  polyethylene glycol 3350 17 Gram(s) Oral daily  senna 2 Tablet(s) Oral at bedtime    MEDICATIONS  (PRN):  metoclopramide Injectable 10 milliGRAM(s) IV Push every 6 hours PRN Nausea/vomiting  ondansetron Injectable 4 milliGRAM(s) IV Push every 6 hours PRN Nausea and/or Vomiting  oxyCODONE    IR 5 milliGRAM(s) Oral every 8 hours PRN Moderate to Severe Pain      PHYSICAL EXAM:  Vital Signs Last 24 Hrs  T(C): 36.3 (23 Dec 2018 15:40), Max: 36.8 (23 Dec 2018 10:08)  T(F): 97.4 (23 Dec 2018 15:40), Max: 98.2 (23 Dec 2018 10:08)  HR: 94 (23 Dec 2018 15:40) (89 - 100)  BP: 103/74 (23 Dec 2018 15:40) (101/70 - 120/76)  BP(mean): --  RR: 16 (23 Dec 2018 15:40) (16 - 18)  SpO2: 96% (23 Dec 2018 15:40) (95% - 98%)  I&O's Summary    23 Dec 2018 07:01  -  23 Dec 2018 15:57  --------------------------------------------------------  IN: 0 mL / OUT: 400 mL / NET: -400 mL      GENERAL: NAD, well-developed  HEAD:  Atraumatic, Normocephalic  EYES: EOMI, PERRLA, conjunctiva and sclera clear  NECK: Supple, No JVD  CHEST/LUNG: Clear to auscultation bilaterally; No wheeze  HEART: Regular rate and rhythm; No murmurs, rubs, or gallops  ABDOMEN: Soft, Nontender, Nondistended; Bowel sounds present  EXTREMITIES:  2+ Peripheral Pulses, No clubbing, cyanosis, or edema  PSYCH: Non verbal  NEUROLOGY: non-focal  SKIN: No rashes or lesions    LABS:  CAPILLARY BLOOD GLUCOSE                              13.4   13.42 )-----------( 47       ( 23 Dec 2018 06:23 )             37.5     12-23    144  |  102  |  24<H>  ----------------------------<  144<H>  3.9   |  23  |  0.73    Ca    8.7      23 Dec 2018 06:23    TPro  5.3<L>  /  Alb  2.6<L>  /  TBili  7.7<H>  /  DBili  x   /  AST  762<H>  /  ALT  495<H>  /  AlkPhos  330<H>  12-23              RADIOLOGY & ADDITIONAL TESTS:    Imaging Personally Reviewed:    Consultant(s) Notes Reviewed:      Care Discussed with Consultants/Other Providers:
Patient is a 63y old  Female who presents with a chief complaint of Weakness (25 Dec 2018 10:07)      SUBJECTIVE / OVERNIGHT EVENTS:  Awake and alert. confused at times.   Review of Systems:   CONSTITUTIONAL: No fever, weight loss,   EYES: No eye pain, visual disturbances, or discharge  ENMT:  No difficulty hearing, tinnitus, vertigo; No sinus or throat pain  NECK: No pain or stiffness  BREASTS: No pain, masses, or nipple discharge  RESPIRATORY: No cough, wheezing, chills or hemoptysis; No shortness of breath  CARDIOVASCULAR: No chest pain, palpitations, dizziness, or leg swelling  GASTROINTESTINAL: No abdominal or epigastric pain. No nausea, vomiting, or hematemesis; No diarrhea or constipation. No melena or hematochezia.  GENITOURINARY: No dysuria, frequency, hematuria, or incontinence  NEUROLOGICAL: No headaches, memory loss, loss of strength, numbness, or tremors  SKIN: No itching, burning, rashes, or lesions   LYMPH NODES: No enlarged glands  ENDOCRINE: No heat or cold intolerance; No hair loss  MUSCULOSKELETAL: No joint pain or swelling; No muscle, back, or extremity pain  PSYCHIATRIC: No depression, anxiety, mood swings, or difficulty sleeping  HEME/LYMPH: No easy bruising, or bleeding gums  ALLERY AND IMMUNOLOGIC: No hives or eczema    MEDICATIONS  (STANDING):  dextrose 5% + sodium chloride 0.9%. 1000 milliLiter(s) (50 mL/Hr) IV Continuous <Continuous>  enoxaparin Injectable 40 milliGRAM(s) SubCutaneous every 24 hours  ganciclovir IVPB 400 milliGRAM(s) IV Intermittent every 12 hours  influenza   Vaccine 0.5 milliLiter(s) IntraMuscular once  nystatin    Suspension 649078 Unit(s) Oral four times a day  piperacillin/tazobactam IVPB. 3.375 Gram(s) IV Intermittent every 8 hours  polyethylene glycol 3350 17 Gram(s) Oral daily  senna 2 Tablet(s) Oral at bedtime    MEDICATIONS  (PRN):  bisacodyl Suppository 10 milliGRAM(s) Rectal daily PRN Constipation  metoclopramide Injectable 10 milliGRAM(s) IV Push every 6 hours PRN Nausea/vomiting  ondansetron Injectable 4 milliGRAM(s) IV Push every 6 hours PRN Nausea and/or Vomiting  oxyCODONE    IR 5 milliGRAM(s) Oral every 8 hours PRN Moderate to Severe Pain      PHYSICAL EXAM:  Vital Signs Last 24 Hrs  T(C): 36.8 (25 Dec 2018 13:46), Max: 36.9 (25 Dec 2018 05:21)  T(F): 98.3 (25 Dec 2018 13:46), Max: 98.4 (25 Dec 2018 05:21)  HR: 76 (25 Dec 2018 13:46) (76 - 96)  BP: 94/68 (25 Dec 2018 13:46) (94/68 - 105/78)  BP(mean): --  RR: 18 (25 Dec 2018 13:46) (16 - 18)  SpO2: 100% (25 Dec 2018 13:46) (96% - 100%)  I&O's Summary    24 Dec 2018 07:01  -  25 Dec 2018 07:00  --------------------------------------------------------  IN: 0 mL / OUT: 1000 mL / NET: -1000 mL      GENERAL: NAD, well-developed  HEAD:  Atraumatic, Normocephalic  EYES: EOMI, PERRLA, conjunctiva and sclera clear  NECK: Supple, No JVD  CHEST/LUNG: Clear to auscultation bilaterally; No wheeze  HEART: Regular rate and rhythm; No murmurs, rubs, or gallops  ABDOMEN: Soft, Nontender, Nondistended; Bowel sounds present  EXTREMITIES:  2+ Peripheral Pulses, No clubbing, cyanosis, or edema  PSYCH: AAOx3  NEUROLOGY: non-focal  SKIN: No rashes or lesions    LABS:  CAPILLARY BLOOD GLUCOSE          12-24    142  |  104  |  20  ----------------------------<  127<H>  3.8   |  20<L>  |  0.69    Ca    8.6      24 Dec 2018 05:49    TPro  5.0<L>  /  Alb  2.4<L>  /  TBili  8.5<H>  /  DBili  x   /  AST  882<H>  /  ALT  505<H>  /  AlkPhos  319<H>  12-24              RADIOLOGY & ADDITIONAL TESTS:    Imaging Personally Reviewed:    Consultant(s) Notes Reviewed:      Care Discussed with Consultants/Other Providers:
Patient is a 63y old  Female who presents with a chief complaint of Weakness (25 Dec 2018 10:07)      SUBJECTIVE / OVERNIGHT EVENTS:  Delirious, lethargic No distress  Review of Systems:   CONSTITUTIONAL: No fever, weight loss, or fatigue  EYES: No eye pain, visual disturbances, or discharge  ENMT:  No difficulty hearing, tinnitus, vertigo; No sinus or throat pain  NECK: No pain or stiffness  BREASTS: No pain, masses, or nipple discharge  RESPIRATORY: No cough, wheezing, chills or hemoptysis; No shortness of breath  CARDIOVASCULAR: No chest pain, palpitations, dizziness, or leg swelling  GASTROINTESTINAL: No abdominal or epigastric pain. No nausea, vomiting, or hematemesis; No diarrhea or constipation. No melena or hematochezia.  GENITOURINARY: No dysuria, frequency, hematuria, or incontinence  NEUROLOGICAL: No headaches, memory loss, loss of strength, numbness, or tremors  SKIN: No itching, burning, rashes, or lesions   LYMPH NODES: No enlarged glands  ENDOCRINE: No heat or cold intolerance; No hair loss  MUSCULOSKELETAL: No joint pain or swelling; No muscle, back, or extremity pain  PSYCHIATRIC: No depression, anxiety, mood swings, or difficulty sleeping  HEME/LYMPH: No easy bruising, or bleeding gums  ALLERY AND IMMUNOLOGIC: No hives or eczema    MEDICATIONS  (STANDING):  dextrose 5% + sodium chloride 0.9%. 1000 milliLiter(s) (50 mL/Hr) IV Continuous <Continuous>  enoxaparin Injectable 40 milliGRAM(s) SubCutaneous every 24 hours  ganciclovir IVPB 400 milliGRAM(s) IV Intermittent every 12 hours  ganciclovir IVPB 400 milliGRAM(s) IV Intermittent every 12 hours  influenza   Vaccine 0.5 milliLiter(s) IntraMuscular once  nystatin    Suspension 255733 Unit(s) Oral four times a day  piperacillin/tazobactam IVPB. 3.375 Gram(s) IV Intermittent every 8 hours  polyethylene glycol 3350 17 Gram(s) Oral daily  senna 2 Tablet(s) Oral at bedtime    MEDICATIONS  (PRN):  bisacodyl Suppository 10 milliGRAM(s) Rectal daily PRN Constipation  metoclopramide Injectable 10 milliGRAM(s) IV Push every 6 hours PRN Nausea/vomiting  ondansetron Injectable 4 milliGRAM(s) IV Push every 6 hours PRN Nausea and/or Vomiting  oxyCODONE    IR 5 milliGRAM(s) Oral every 8 hours PRN Moderate to Severe Pain      PHYSICAL EXAM:  Vital Signs Last 24 Hrs  T(C): 36.8 (25 Dec 2018 13:46), Max: 36.9 (25 Dec 2018 05:21)  T(F): 98.3 (25 Dec 2018 13:46), Max: 98.4 (25 Dec 2018 05:21)  HR: 76 (25 Dec 2018 13:46) (76 - 96)  BP: 94/68 (25 Dec 2018 13:46) (94/68 - 105/78)  BP(mean): --  RR: 18 (25 Dec 2018 13:46) (16 - 18)  SpO2: 100% (25 Dec 2018 13:46) (96% - 100%)  I&O's Summary    24 Dec 2018 07:01  -  25 Dec 2018 07:00  --------------------------------------------------------  IN: 0 mL / OUT: 1000 mL / NET: -1000 mL      GENERAL: NAD, well-developed  HEAD:  Atraumatic, Normocephalic  EYES: EOMI, PERRLA, conjunctiva and sclera clear  NECK: Supple, No JVD  CHEST/LUNG: Clear to auscultation bilaterally; No wheeze  HEART: Regular rate and rhythm; No murmurs, rubs, or gallops  ABDOMEN: Soft, Nontender, Nondistended; Bowel sounds present  EXTREMITIES:  2+ Peripheral Pulses, No clubbing, cyanosis, or edema  PSYCH: Confused  SKIN: No rashes or lesions    LABS:  CAPILLARY BLOOD GLUCOSE          12-24    142  |  104  |  20  ----------------------------<  127<H>  3.8   |  20<L>  |  0.69    Ca    8.6      24 Dec 2018 05:49    TPro  5.0<L>  /  Alb  2.4<L>  /  TBili  8.5<H>  /  DBili  x   /  AST  882<H>  /  ALT  505<H>  /  AlkPhos  319<H>  12-24              RADIOLOGY & ADDITIONAL TESTS:    Imaging Personally Reviewed:    Consultant(s) Notes Reviewed:      Care Discussed with Consultants/Other Providers:
Patient is a 63y old  Female who presents with a chief complaint of Weakness (26 Dec 2018 15:20)      SUBJECTIVE / OVERNIGHT EVENTS:  Remains lethargic but easily arousable. Afebrile  Review of Systems:   CONSTITUTIONAL: No fever, weight loss, or fatigue  EYES: No eye pain, visual disturbances, or discharge  ENMT:  No difficulty hearing, tinnitus, vertigo; No sinus or throat pain  NECK: No pain or stiffness  BREASTS: No pain, masses, or nipple discharge  RESPIRATORY: No cough, wheezing, chills or hemoptysis; No shortness of breath  CARDIOVASCULAR: No chest pain, palpitations, dizziness, or leg swelling  GASTROINTESTINAL: No abdominal or epigastric pain. No nausea, vomiting, or hematemesis; No diarrhea or constipation. No melena or hematochezia.  GENITOURINARY: No dysuria, frequency, hematuria, or incontinence  NEUROLOGICAL: No headaches, memory loss, loss of strength, numbness, or tremors  SKIN: No itching, burning, rashes, or lesions   LYMPH NODES: No enlarged glands  ENDOCRINE: No heat or cold intolerance; No hair loss  MUSCULOSKELETAL: No joint pain or swelling; No muscle, back, or extremity pain  PSYCHIATRIC: No depression, anxiety, mood swings, or difficulty sleeping  HEME/LYMPH: No easy bruising, or bleeding gums  ALLERY AND IMMUNOLOGIC: No hives or eczema    MEDICATIONS  (STANDING):  dextrose 5% + sodium chloride 0.9%. 1000 milliLiter(s) (75 mL/Hr) IV Continuous <Continuous>  enoxaparin Injectable 40 milliGRAM(s) SubCutaneous every 24 hours  ganciclovir IVPB 400 milliGRAM(s) IV Intermittent every 12 hours  influenza   Vaccine 0.5 milliLiter(s) IntraMuscular once  nystatin    Suspension 866564 Unit(s) Oral four times a day  piperacillin/tazobactam IVPB. 3.375 Gram(s) IV Intermittent every 8 hours  polyethylene glycol 3350 17 Gram(s) Oral daily  senna 2 Tablet(s) Oral at bedtime    MEDICATIONS  (PRN):  bisacodyl Suppository 10 milliGRAM(s) Rectal daily PRN Constipation  metoclopramide Injectable 10 milliGRAM(s) IV Push every 6 hours PRN Nausea/vomiting  morphine  - Injectable 2 milliGRAM(s) IV Push every 4 hours PRN pain  ondansetron Injectable 4 milliGRAM(s) IV Push every 6 hours PRN Nausea and/or Vomiting  oxyCODONE    IR 5 milliGRAM(s) Oral every 8 hours PRN Moderate to Severe Pain      PHYSICAL EXAM:  Vital Signs Last 24 Hrs  T(C): 36.4 (26 Dec 2018 20:12), Max: 36.8 (26 Dec 2018 05:10)  T(F): 97.5 (26 Dec 2018 20:12), Max: 98.3 (26 Dec 2018 05:10)  HR: 89 (26 Dec 2018 20:45) (89 - 102)  BP: 103/68 (26 Dec 2018 20:12) (102/81 - 104/76)  BP(mean): --  RR: 17 (26 Dec 2018 20:12) (17 - 18)  SpO2: 97% (26 Dec 2018 20:12) (97% - 100%)  I&O's Summary    25 Dec 2018 07:01  -  26 Dec 2018 07:00  --------------------------------------------------------  IN: 150 mL / OUT: 225 mL / NET: -75 mL    26 Dec 2018 07:01  -  26 Dec 2018 22:54  --------------------------------------------------------  IN: 0 mL / OUT: 100 mL / NET: -100 mL      GENERAL: NAD, well-developed  HEAD:  Atraumatic, Normocephalic  EYES: EOMI, PERRLA, conjunctiva and sclera clear  NECK: Supple, No JVD  CHEST/LUNG: Clear to auscultation bilaterally; No wheeze  HEART: Regular rate and rhythm; No murmurs, rubs, or gallops  ABDOMEN: Soft, Nontender, Nondistended; Bowel sounds present  EXTREMITIES:  2+ Peripheral Pulses, No clubbing, cyanosis, or edema  PSYCH: Arousable, confused  NEUROLOGY: non-focal  SKIN: No rashes or lesions    LABS:  CAPILLARY BLOOD GLUCOSE                              11.7   15.19 )-----------( 37       ( 26 Dec 2018 06:07 )             32.4     12-26    143  |  105  |  24<H>  ----------------------------<  137<H>  3.8   |  21<L>  |  0.86    Ca    8.8      26 Dec 2018 06:07    TPro  4.8<L>  /  Alb  2.0<L>  /  TBili  7.8<H>  /  DBili  x   /  AST  777<H>  /  ALT  436<H>  /  AlkPhos  284<H>  12-26              RADIOLOGY & ADDITIONAL TESTS:    Imaging Personally Reviewed:    Consultant(s) Notes Reviewed:      Care Discussed with Consultants/Other Providers:
Patient is a 63y old  Female who presents with a chief complaint of Weakness (28 Dec 2018 14:53)      SUBJECTIVE / OVERNIGHT EVENTS:  Afebrile, lethargic.  Review of Systems:   CONSTITUTIONAL: No fever, weight loss, or fatigue  EYES: No eye pain, visual disturbances, or discharge  ENMT:  No difficulty hearing, tinnitus, vertigo; No sinus or throat pain  NECK: No pain or stiffness  BREASTS: No pain, masses, or nipple discharge  RESPIRATORY: No cough, wheezing, chills or hemoptysis; No shortness of breath  CARDIOVASCULAR: No chest pain, palpitations, dizziness, or leg swelling  GASTROINTESTINAL: No abdominal or epigastric pain. No nausea, vomiting, or hematemesis; No diarrhea or constipation. No melena or hematochezia.  GENITOURINARY: No dysuria, frequency, hematuria, or incontinence  NEUROLOGICAL: No headaches, memory loss, loss of strength, numbness, or tremors  SKIN: No itching, burning, rashes, or lesions   LYMPH NODES: No enlarged glands  ENDOCRINE: No heat or cold intolerance; No hair loss  MUSCULOSKELETAL: No joint pain or swelling; No muscle, back, or extremity pain  PSYCHIATRIC: No depression, anxiety, mood swings, or difficulty sleeping  HEME/LYMPH: No easy bruising, or bleeding gums  ALLERY AND IMMUNOLOGIC: No hives or eczema    MEDICATIONS  (STANDING):  dextrose 5% + sodium chloride 0.9%. 1000 milliLiter(s) (75 mL/Hr) IV Continuous <Continuous>  enoxaparin Injectable 40 milliGRAM(s) SubCutaneous every 24 hours  ganciclovir IVPB 400 milliGRAM(s) IV Intermittent every 12 hours  influenza   Vaccine 0.5 milliLiter(s) IntraMuscular once  lactulose Syrup 10 Gram(s) Oral two times a day  nystatin    Suspension 000932 Unit(s) Oral four times a day  piperacillin/tazobactam IVPB. 3.375 Gram(s) IV Intermittent every 8 hours  polyethylene glycol 3350 17 Gram(s) Oral daily  senna 2 Tablet(s) Oral at bedtime    MEDICATIONS  (PRN):  bisacodyl Suppository 10 milliGRAM(s) Rectal daily PRN Constipation  metoclopramide Injectable 10 milliGRAM(s) IV Push every 6 hours PRN Nausea/vomiting  morphine  - Injectable 1 milliGRAM(s) IV Push every 4 hours PRN Severe Pain (7 - 10)  ondansetron Injectable 4 milliGRAM(s) IV Push every 6 hours PRN Nausea and/or Vomiting      PHYSICAL EXAM:  Vital Signs Last 24 Hrs  T(C): 36.6 (28 Dec 2018 10:59), Max: 37 (27 Dec 2018 22:18)  T(F): 97.8 (28 Dec 2018 10:59), Max: 98.6 (27 Dec 2018 22:18)  HR: 72 (28 Dec 2018 18:46) (69 - 92)  BP: 96/72 (28 Dec 2018 18:46) (96/72 - 122/57)  BP(mean): --  RR: 18 (28 Dec 2018 10:59) (17 - 18)  SpO2: 100% (28 Dec 2018 10:59) (96% - 100%)  I&O's Summary    27 Dec 2018 07:01  -  28 Dec 2018 07:00  --------------------------------------------------------  IN: 0 mL / OUT: 365 mL / NET: -365 mL    28 Dec 2018 07:01  -  28 Dec 2018 18:49  --------------------------------------------------------  IN: 0 mL / OUT: 250 mL / NET: -250 mL      GENERAL: NAD, well-developed  HEAD:  Atraumatic, Normocephalic  EYES: EOMI, PERRLA, conjunctiva and sclera clear  NECK: Supple, No JVD  CHEST/LUNG: Clear to auscultation bilaterally; No wheeze  HEART: Regular rate and rhythm; No murmurs, rubs, or gallops  ABDOMEN: Soft, Nontender, Nondistended; Bowel sounds present  EXTREMITIES:  2+ Peripheral Pulses, No clubbing, cyanosis, or edema  PSYCH: AAOx3  NEUROLOGY: non-focal  SKIN: No rashes or lesions    LABS:  CAPILLARY BLOOD GLUCOSE                              13.0   12.10 )-----------( 42       ( 28 Dec 2018 06:28 )             36.0     12-28    138  |  101  |  32<H>  ----------------------------<  136<H>  4.1   |  13<L>  |  0.83    Ca    8.8      28 Dec 2018 06:28  Phos  2.6     12-28  Mg     2.3     12-28    TPro  5.0<L>  /  Alb  2.0<L>  /  TBili  9.9<H>  /  DBili  x   /  AST  883<H>  /  ALT  543<H>  /  AlkPhos  284<H>  12-28              RADIOLOGY & ADDITIONAL TESTS:    Imaging Personally Reviewed:    Consultant(s) Notes Reviewed:      Care Discussed with Consultants/Other Providers:
Infectious Diseases progress note:    Subjective:  No acute o/n events.  Pt remains thrombocytopenic.  No fevers.  Lactate and bilirubin trending upwards. Pt c/o abd pain earlier today.  Family member at bedside.      ROS:  Minimally verbal    Allergies    No Known Allergies    Intolerances        ANTIBIOTICS/RELEVANT:  antimicrobials  ganciclovir IVPB 400 milliGRAM(s) IV Intermittent every 12 hours  nystatin    Suspension 303918 Unit(s) Oral four times a day  piperacillin/tazobactam IVPB. 3.375 Gram(s) IV Intermittent every 8 hours    immunologic:  influenza   Vaccine 0.5 milliLiter(s) IntraMuscular once    OTHER:  bisacodyl Suppository 10 milliGRAM(s) Rectal daily PRN  dextrose 5% + sodium chloride 0.9%. 1000 milliLiter(s) IV Continuous <Continuous>  enoxaparin Injectable 40 milliGRAM(s) SubCutaneous every 24 hours  lactulose Syrup 10 Gram(s) Oral two times a day  metoclopramide Injectable 10 milliGRAM(s) IV Push every 6 hours PRN  morphine  - Injectable 2 milliGRAM(s) IV Push every 4 hours PRN  ondansetron Injectable 4 milliGRAM(s) IV Push every 6 hours PRN  polyethylene glycol 3350 17 Gram(s) Oral daily  senna 2 Tablet(s) Oral at bedtime      Objective:  Vital Signs Last 24 Hrs  T(C): 36.7 (27 Dec 2018 16:03), Max: 36.7 (27 Dec 2018 16:03)  T(F): 98 (27 Dec 2018 16:03), Max: 98 (27 Dec 2018 16:03)  HR: 104 (27 Dec 2018 16:03) (76 - 104)  BP: 99/68 (27 Dec 2018 16:03) (99/68 - 142/92)  BP(mean): --  RR: 16 (27 Dec 2018 16:03) (16 - 17)  SpO2: 97% (27 Dec 2018 16:03) (97% - 97%)    PHYSICAL EXAM:    Constitutional:NAD  Eyes:BALBIR, EOMI  Ear/Nose/Throat: no thrush, mucositis.  Moist mucous membranes	  Neck:no JVD, no lymphadenopathy, supple  Respiratory: CTA delores  Cardiovascular: S1S2 RRR, no murmurs  Gastrointestinal:soft, nontender,  nondistended (+) BS  Extremities:no e/e/c  Skin:  no rashes, open wounds or ulcerations        LABS:                        12.7   12.09 )-----------( 31       ( 27 Dec 2018 06:30 )             37.1     12-27    139  |  104  |  27<H>  ----------------------------<  105<H>  4.8   |  16<L>  |  0.85    Ca    8.8      27 Dec 2018 06:30  Phos  2.2     12-27  Mg     2.3     12-27    TPro  5.0<L>  /  Alb  2.0<L>  /  TBili  8.5<H>  /  DBili  6.3<H>  /  AST  642<H>  /  ALT  422<H>  /  AlkPhos  281<H>  12-27              MICROBIOLOGY:    Culture - Blood (12.22.18 @ 06:03)    Culture - Blood:   NO ORGANISMS ISOLATED    Specimen Source: BLOOD PERIPHERAL      Culture - Blood (12.21.18 @ 13:20)    Culture - Blood:   NO ORGANISMS ISOLATED    Specimen Source: BLOOD PERIPHERAL        RADIOLOGY & ADDITIONAL STUDIES:
Patient is a 63y old  Female who presents with a chief complaint of Weakness (29 Dec 2018 12:02)      SUBJECTIVE / OVERNIGHT EVENTS:  Lethargic  Review of Systems:   CONSTITUTIONAL: No fever, weight loss, or fatigue  EYES: No eye pain, visual disturbances, or discharge  ENMT:  No difficulty hearing, tinnitus, vertigo; No sinus or throat pain  NECK: No pain or stiffness  BREASTS: No pain, masses, or nipple discharge  RESPIRATORY: No cough, wheezing, chills or hemoptysis; No shortness of breath  CARDIOVASCULAR: No chest pain, palpitations, dizziness, or leg swelling  GASTROINTESTINAL: No abdominal or epigastric pain. No nausea, vomiting, or hematemesis; No diarrhea or constipation. No melena or hematochezia.  GENITOURINARY: No dysuria, frequency, hematuria, or incontinence  NEUROLOGICAL: No headaches, memory loss, loss of strength, numbness, or tremors  SKIN: No itching, burning, rashes, or lesions   LYMPH NODES: No enlarged glands  ENDOCRINE: No heat or cold intolerance; No hair loss  MUSCULOSKELETAL: No joint pain or swelling; No muscle, back, or extremity pain  PSYCHIATRIC: No depression, anxiety, mood swings, or difficulty sleeping  HEME/LYMPH: No easy bruising, or bleeding gums  ALLERY AND IMMUNOLOGIC: No hives or eczema    MEDICATIONS  (STANDING):  dextrose 5% + sodium chloride 0.9%. 1000 milliLiter(s) (75 mL/Hr) IV Continuous <Continuous>  ganciclovir IVPB 400 milliGRAM(s) IV Intermittent every 12 hours  influenza   Vaccine 0.5 milliLiter(s) IntraMuscular once  nystatin    Suspension 643608 Unit(s) Oral four times a day  piperacillin/tazobactam IVPB. 3.375 Gram(s) IV Intermittent every 8 hours  polyethylene glycol 3350 17 Gram(s) Oral daily  senna 2 Tablet(s) Oral at bedtime    MEDICATIONS  (PRN):  bisacodyl Suppository 10 milliGRAM(s) Rectal daily PRN Constipation  metoclopramide Injectable 10 milliGRAM(s) IV Push every 6 hours PRN Nausea/vomiting  morphine  - Injectable 1 milliGRAM(s) IV Push every 4 hours PRN Severe Pain (7 - 10)  ondansetron Injectable 4 milliGRAM(s) IV Push every 6 hours PRN Nausea and/or Vomiting      PHYSICAL EXAM:  Vital Signs Last 24 Hrs  T(C): 36.4 (30 Dec 2018 13:07), Max: 36.4 (29 Dec 2018 21:39)  T(F): 97.5 (30 Dec 2018 13:07), Max: 97.6 (29 Dec 2018 21:39)  HR: 94 (30 Dec 2018 13:07) (90 - 96)  BP: 114/75 (30 Dec 2018 13:07) (101/72 - 115/80)  BP(mean): --  RR: 18 (30 Dec 2018 13:07) (18 - 19)  SpO2: 100% (30 Dec 2018 13:07) (98% - 100%)  I&O's Summary    29 Dec 2018 07:01  -  30 Dec 2018 07:00  --------------------------------------------------------  IN: 0 mL / OUT: 300 mL / NET: -300 mL      GENERAL: NAD, well-developed  HEAD:  Atraumatic, Normocephalic  EYES: EOMI, PERRLA, conjunctiva and sclera clear  NECK: Supple, No JVD  CHEST/LUNG: Clear to auscultation bilaterally; No wheeze  HEART: Regular rate and rhythm; No murmurs, rubs, or gallops  ABDOMEN: Soft, Nontender, Nondistended; Bowel sounds present  EXTREMITIES:  2+ Peripheral Pulses, No clubbing, cyanosis, or edema  PSYCH: AAOx3  NEUROLOGY: non-focal  SKIN: No rashes or lesions    LABS:  CAPILLARY BLOOD GLUCOSE                              12.3   12.69 )-----------( 36       ( 30 Dec 2018 04:50 )             35.0     12-30    141  |  105  |  45<H>  ----------------------------<  120<H>  4.0   |  11<L>  |  1.12    Ca    9.0      30 Dec 2018 04:50  Phos  3.0     12-30  Mg     2.4     12-30    TPro  5.1<L>  /  Alb  2.0<L>  /  TBili  14.0<H>  /  DBili  x   /  AST  1068<H>  /  ALT  687<H>  /  AlkPhos  304<H>  12-30              RADIOLOGY & ADDITIONAL TESTS:    Imaging Personally Reviewed:    Consultant(s) Notes Reviewed:      Care Discussed with Consultants/Other Providers:
Pt feels OK, just tired, not hungry, no pain or bleeding of fevers or chills. Overall, she is comfortable.       Meds:  enoxaparin Injectable 40 milliGRAM(s) SubCutaneous every 24 hours  influenza   Vaccine 0.5 milliLiter(s) IntraMuscular once  metoclopramide Injectable 10 milliGRAM(s) IV Push every 6 hours PRN  nystatin    Suspension 819502 Unit(s) Oral four times a day  ondansetron Injectable 4 milliGRAM(s) IV Push every 6 hours PRN  oxyCODONE    IR 5 milliGRAM(s) Oral every 8 hours PRN  piperacillin/tazobactam IVPB. 3.375 Gram(s) IV Intermittent every 8 hours  polyethylene glycol 3350 17 Gram(s) Oral daily  senna 2 Tablet(s) Oral at bedtime      Vital Signs Last 24 Hrs  T(C): 36.4 (22 Dec 2018 05:38), Max: 36.7 (21 Dec 2018 10:04)  T(F): 97.5 (22 Dec 2018 05:38), Max: 98 (21 Dec 2018 10:04)  HR: 84 (22 Dec 2018 05:38) (70 - 93)  BP: 109/74 (22 Dec 2018 05:38) (109/74 - 122/77)  BP(mean): --  RR: 18 (22 Dec 2018 05:38) (18 - 118)  SpO2: 98% (22 Dec 2018 05:38) (98% - 100%)                          13.6   12.77 )-----------( 69       ( 22 Dec 2018 05:18 )             37.7       12-22    141  |  99  |  27<H>  ----------------------------<  118<H>  4.5   |  25  |  0.69    Ca    9.5      22 Dec 2018 05:18    TPro  5.6<L>  /  Alb  2.7<L>  /  TBili  6.6<H>  /  DBili  x   /  AST  440<H>  /  ALT  420<H>  /  AlkPhos  360<H>  12-22        Blood c/s : 1 of 2 bottles GNR    urine c/s : GNR    Ct a/p: EXAM:  CT ABDOMEN AND PELVIS IC        PROCEDURE DATE:  Dec 21 2018     ******PRELIMINARY REPORT******    ******PRELIMINARY REPORT******            INTERPRETATION:  No emergent findings.  Diffuse lung and hepatic metastasis.  Left breast lesion.  Trace right pleural effusion.            ******PRELIMINARY REPORT******    ******PRELIMINARY REPORT******          DU DELEON M.D., RADIOLOGY RESIDENT
Patient is a 63y old  Female who presents with a chief complaint of Weakness (01 Jan 2019 10:38)      SUBJECTIVE / OVERNIGHT EVENTS:  Non verbal, no distress  Review of Systems:   CONSTITUTIONAL: No fever, weight loss, or fatigue  EYES: No eye pain, visual disturbances, or discharge  ENMT:  No difficulty hearing, tinnitus, vertigo; No sinus or throat pain  NECK: No pain or stiffness  BREASTS: No pain, masses, or nipple discharge  RESPIRATORY: No cough, wheezing, chills or hemoptysis; No shortness of breath  CARDIOVASCULAR: No chest pain, palpitations, dizziness, or leg swelling  GASTROINTESTINAL: No abdominal or epigastric pain. No nausea, vomiting, or hematemesis; No diarrhea or constipation. No melena or hematochezia.  GENITOURINARY: No dysuria, frequency, hematuria, or incontinence  NEUROLOGICAL: No headaches, memory loss, loss of strength, numbness, or tremors  SKIN: No itching, burning, rashes, or lesions   LYMPH NODES: No enlarged glands  ENDOCRINE: No heat or cold intolerance; No hair loss  MUSCULOSKELETAL: No joint pain or swelling; No muscle, back, or extremity pain  PSYCHIATRIC: No depression, anxiety, mood swings, or difficulty sleeping  HEME/LYMPH: No easy bruising, or bleeding gums  ALLERY AND IMMUNOLOGIC: No hives or eczema    MEDICATIONS  (STANDING):  ALBUTerol/ipratropium for Nebulization 3 milliLiter(s) Nebulizer every 6 hours  dextrose 5% + sodium chloride 0.9%. 1000 milliLiter(s) (75 mL/Hr) IV Continuous <Continuous>  ganciclovir IVPB 400 milliGRAM(s) IV Intermittent every 12 hours  influenza   Vaccine 0.5 milliLiter(s) IntraMuscular once  nystatin    Suspension 210571 Unit(s) Oral four times a day  piperacillin/tazobactam IVPB. 3.375 Gram(s) IV Intermittent every 8 hours  polyethylene glycol 3350 17 Gram(s) Oral daily  senna 2 Tablet(s) Oral at bedtime    MEDICATIONS  (PRN):  bisacodyl Suppository 10 milliGRAM(s) Rectal daily PRN Constipation  glycopyrrolate Injectable 0.4 milliGRAM(s) IV Push every 6 hours PRN upper airway secretions  LORazepam   Injectable 0.5 milliGRAM(s) IV Push every 4 hours PRN agitation / anxiety  metoclopramide Injectable 10 milliGRAM(s) IV Push every 6 hours PRN Nausea/vomiting  morphine  - Injectable 1 milliGRAM(s) IV Push every 2 hours PRN sob/ labored breathing  morphine  - Injectable 1 milliGRAM(s) IV Push every 4 hours PRN Severe Pain (7 - 10)  ondansetron Injectable 4 milliGRAM(s) IV Push every 6 hours PRN Nausea and/or Vomiting      PHYSICAL EXAM:  Vital Signs Last 24 Hrs  T(C): 36.5 (01 Jan 2019 13:40), Max: 36.6 (01 Jan 2019 08:17)  T(F): 97.7 (01 Jan 2019 13:40), Max: 97.8 (01 Jan 2019 08:17)  HR: 98 (01 Jan 2019 16:26) (96 - 114)  BP: 88/56 (01 Jan 2019 16:26) (88/56 - 117/76)  BP(mean): --  RR: 26 (01 Jan 2019 16:26) (18 - 26)  SpO2: 94% (01 Jan 2019 16:26) (94% - 99%)  I&O's Summary    GENERAL: NAD, well-developed  HEAD:  Atraumatic, Normocephalic  EYES: EOMI, PERRLA, conjunctiva and sclera clear  NECK: Supple, No JVD  CHEST/LUNG: Clear to auscultation bilaterally; No wheeze  HEART: Regular rate and rhythm; No murmurs, rubs, or gallops  ABDOMEN: Soft, Nontender, Nondistended; Bowel sounds present  EXTREMITIES:  2+ Peripheral Pulses, No clubbing, cyanosis, or edema  PSYCH: AAOx3  NEUROLOGY: non-focal  SKIN: No rashes or lesions    LABS:  CAPILLARY BLOOD GLUCOSE                              11.7   9.86  )-----------( 33       ( 01 Jan 2019 07:40 )             33.7     01-01    141  |  106  |  56<H>  ----------------------------<  130<H>  5.8<H>   |  7<LL>  |  1.21    Ca    9.7      01 Jan 2019 07:40  Phos  4.0     01-01  Mg     2.5     01-01    TPro  4.8<L>  /  Alb  1.8<L>  /  TBili  15.2<H>  /  DBili  9.9<H>  /  AST  490<H>  /  ALT  450<H>  /  AlkPhos  242<H>  01-01              RADIOLOGY & ADDITIONAL TESTS:    Imaging Personally Reviewed:    Consultant(s) Notes Reviewed:      Care Discussed with Consultants/Other Providers:
Patient is a 63y old  Female who presents with a chief complaint of Weakness (22 Dec 2018 14:52)      SUBJECTIVE / OVERNIGHT EVENTS:  Lethargic, barely responds on verbal command  Review of Systems:   CONSTITUTIONAL: No fever, weight loss,   EYES: No eye pain, visual disturbances, or discharge  ENMT:  No difficulty hearing, tinnitus, vertigo; No sinus or throat pain  NECK: No pain or stiffness  BREASTS: No pain, masses, or nipple discharge  RESPIRATORY: No cough, wheezing, chills or hemoptysis; No shortness of breath  CARDIOVASCULAR: No chest pain, palpitations, dizziness, or leg swelling  GASTROINTESTINAL: No abdominal or epigastric pain. No nausea, vomiting, or hematemesis; No diarrhea or constipation. No melena or hematochezia.  GENITOURINARY: No dysuria, frequency, hematuria, or incontinence    SKIN: No itching, burning, rashes, or lesions   LYMPH NODES: No enlarged glands  ENDOCRINE: No heat or cold intolerance; No hair loss  MUSCULOSKELETAL: No joint pain or swelling; No muscle, back, or extremity pain  PSYCHIATRIC: No depression, anxiety, mood swings, or difficulty sleeping  HEME/LYMPH: No easy bruising, or bleeding gums  ALLERY AND IMMUNOLOGIC: No hives or eczema    MEDICATIONS  (STANDING):  dextrose 5% + sodium chloride 0.9%. 1000 milliLiter(s) (50 mL/Hr) IV Continuous <Continuous>  enoxaparin Injectable 40 milliGRAM(s) SubCutaneous every 24 hours  influenza   Vaccine 0.5 milliLiter(s) IntraMuscular once  nystatin    Suspension 140997 Unit(s) Oral four times a day  piperacillin/tazobactam IVPB. 3.375 Gram(s) IV Intermittent every 8 hours  polyethylene glycol 3350 17 Gram(s) Oral daily  senna 2 Tablet(s) Oral at bedtime    MEDICATIONS  (PRN):  metoclopramide Injectable 10 milliGRAM(s) IV Push every 6 hours PRN Nausea/vomiting  ondansetron Injectable 4 milliGRAM(s) IV Push every 6 hours PRN Nausea and/or Vomiting  oxyCODONE    IR 5 milliGRAM(s) Oral every 8 hours PRN Moderate to Severe Pain      PHYSICAL EXAM:  Vital Signs Last 24 Hrs  T(C): 36.7 (22 Dec 2018 17:57), Max: 37 (22 Dec 2018 13:37)  T(F): 98 (22 Dec 2018 17:57), Max: 98.6 (22 Dec 2018 13:37)  HR: 89 (22 Dec 2018 17:57) (84 - 98)  BP: 120/76 (22 Dec 2018 17:57) (109/74 - 120/78)  BP(mean): --  RR: 18 (22 Dec 2018 17:57) (18 - 18)  SpO2: 98% (22 Dec 2018 17:57) (97% - 100%)  I&O's Summary    GENERAL: NAD, well-developed  HEAD:  Atraumatic, Normocephalic  EYES: EOMI, PERRLA, conjunctiva and sclera clear  NECK: Supple, No JVD  CHEST/LUNG: Clear to auscultation bilaterally; No wheeze  HEART: Regular rate and rhythm; No murmurs, rubs, or gallops  ABDOMEN: Soft, Nontender, Nondistended; Bowel sounds present  EXTREMITIES:  2+ Peripheral Pulses, No clubbing, cyanosis, or edema  PSYCH: Lethargic unrespnsive  SKIN: No rashes or lesions    LABS:  CAPILLARY BLOOD GLUCOSE                              13.6   12.77 )-----------( 69       ( 22 Dec 2018 05:18 )             37.7     12    141  |  99  |  27<H>  ----------------------------<  118<H>  4.5   |  25  |  0.69    Ca    9.5      22 Dec 2018 05:18    TPro  5.6<L>  /  Alb  2.7<L>  /  TBili  6.6<H>  /  DBili  x   /  AST  440<H>  /  ALT  420<H>  /  AlkPhos  360<H>            Urinalysis Basic - ( 21 Dec 2018 09:45 )    Color: DARK YELLOW / Appearance: Lt TURBID / S.025 / pH: 6.0  Gluc: NEGATIVE / Ketone: TRACE  / Bili: TRACE / Urobili: MODERATE   Blood: TRACE / Protein: 20 / Nitrite: NEGATIVE   Leuk Esterase: SMALL / RBC: 6-10 / WBC 6-10   Sq Epi: FEW / Non Sq Epi: x / Bacteria: MODERATE        RADIOLOGY & ADDITIONAL TESTS:    Imaging Personally Reviewed:    Consultant(s) Notes Reviewed:      Care Discussed with Consultants/Other Providers:
Pt appears less responsive today, dis not talk today, her brother is here from [facility] and her boy friiend is here as well.       Meds:  bisacodyl Suppository 10 milliGRAM(s) Rectal daily PRN  dextrose 5% + sodium chloride 0.9%. 1000 milliLiter(s) IV Continuous <Continuous>  ganciclovir IVPB 400 milliGRAM(s) IV Intermittent every 12 hours  influenza   Vaccine 0.5 milliLiter(s) IntraMuscular once  metoclopramide Injectable 10 milliGRAM(s) IV Push every 6 hours PRN  morphine  - Injectable 1 milliGRAM(s) IV Push every 4 hours PRN  nystatin    Suspension 169200 Unit(s) Oral four times a day  ondansetron Injectable 4 milliGRAM(s) IV Push every 6 hours PRN  piperacillin/tazobactam IVPB. 3.375 Gram(s) IV Intermittent every 8 hours  polyethylene glycol 3350 17 Gram(s) Oral daily  senna 2 Tablet(s) Oral at bedtime      Vital Signs Last 24 Hrs  T(C): 36.3 (29 Dec 2018 05:57), Max: 36.6 (28 Dec 2018 10:59)  T(F): 97.3 (29 Dec 2018 05:57), Max: 97.8 (28 Dec 2018 10:59)  HR: 96 (29 Dec 2018 05:57) (69 - 96)  BP: 118/83 (29 Dec 2018 05:57) (96/72 - 129/92)  BP(mean): --  RR: 19 (29 Dec 2018 05:57) (18 - 19)  SpO2: 100% (29 Dec 2018 05:57) (99% - 100%)                          11.0   13.16 )-----------( 29       ( 29 Dec 2018 06:57 )             31.5       12-29    140  |  104  |  41<H>  ----------------------------<  150<H>  3.7   |  11<L>  |  0.99    Ca    9.0      29 Dec 2018 09:30  Phos  2.7     12-29  Mg     2.4     12-29    TPro  4.9<L>  /  Alb  2.1<L>  /  TBili  12.7<H>  /  DBili  x   /  AST  x   /  ALT  x   /  AlkPhos  296<H>  12-29
Patient is a 63y old  Female who presents with a chief complaint of Weakness (29 Dec 2018 14:20)      SUBJECTIVE / OVERNIGHT EVENTS: Lethargic, non verbal, no distress  Review of Systems:   CONSTITUTIONAL: No fever, weight loss, or fatigue  EYES: No eye pain, visual disturbances, or discharge  ENMT:  No difficulty hearing, tinnitus, vertigo; No sinus or throat pain  NECK: No pain or stiffness  BREASTS: No pain, masses, or nipple discharge  RESPIRATORY: No cough, wheezing, chills or hemoptysis; No shortness of breath  CARDIOVASCULAR: No chest pain, palpitations, dizziness, or leg swelling  GASTROINTESTINAL: No abdominal or epigastric pain. No nausea, vomiting, or hematemesis; No diarrhea or constipation. No melena or hematochezia.  GENITOURINARY: No dysuria, frequency, hematuria, or incontinence  NEUROLOGICAL: No headaches, memory loss, loss of strength, numbness, or tremors  SKIN: No itching, burning, rashes, or lesions   LYMPH NODES: No enlarged glands  ENDOCRINE: No heat or cold intolerance; No hair loss  MUSCULOSKELETAL: No joint pain or swelling; No muscle, back, or extremity pain  PSYCHIATRIC: No depression, anxiety, mood swings, or difficulty sleeping  HEME/LYMPH: No easy bruising, or bleeding gums  ALLERY AND IMMUNOLOGIC: No hives or eczema    MEDICATIONS  (STANDING):  dextrose 5% + sodium chloride 0.9%. 1000 milliLiter(s) (75 mL/Hr) IV Continuous <Continuous>  ganciclovir IVPB 400 milliGRAM(s) IV Intermittent every 12 hours  influenza   Vaccine 0.5 milliLiter(s) IntraMuscular once  nystatin    Suspension 098253 Unit(s) Oral four times a day  piperacillin/tazobactam IVPB. 3.375 Gram(s) IV Intermittent every 8 hours  polyethylene glycol 3350 17 Gram(s) Oral daily  senna 2 Tablet(s) Oral at bedtime    MEDICATIONS  (PRN):  bisacodyl Suppository 10 milliGRAM(s) Rectal daily PRN Constipation  metoclopramide Injectable 10 milliGRAM(s) IV Push every 6 hours PRN Nausea/vomiting  morphine  - Injectable 1 milliGRAM(s) IV Push every 4 hours PRN Severe Pain (7 - 10)  ondansetron Injectable 4 milliGRAM(s) IV Push every 6 hours PRN Nausea and/or Vomiting      PHYSICAL EXAM:  Vital Signs Last 24 Hrs  T(C): 36.4 (30 Dec 2018 13:07), Max: 36.4 (29 Dec 2018 21:39)  T(F): 97.5 (30 Dec 2018 13:07), Max: 97.6 (29 Dec 2018 21:39)  HR: 94 (30 Dec 2018 13:07) (90 - 96)  BP: 114/75 (30 Dec 2018 13:07) (101/72 - 115/80)  BP(mean): --  RR: 18 (30 Dec 2018 13:07) (18 - 19)  SpO2: 100% (30 Dec 2018 13:07) (98% - 100%)  I&O's Summary    29 Dec 2018 07:01  -  30 Dec 2018 07:00  --------------------------------------------------------  IN: 0 mL / OUT: 300 mL / NET: -300 mL    30 Dec 2018 07:01  -  30 Dec 2018 16:22  --------------------------------------------------------  IN: 0 mL / OUT: 400 mL / NET: -400 mL      GENERAL: NAD, well-developed  HEAD:  Atraumatic, Normocephalic  EYES: EOMI, PERRLA, conjunctiva and sclera clear  NECK: Supple, No JVD  CHEST/LUNG: Clear to auscultation bilaterally; No wheeze  HEART: Regular rate and rhythm; No murmurs, rubs, or gallops  ABDOMEN: Soft, Nontender, Nondistended; Bowel sounds present  EXTREMITIES:  2+ Peripheral Pulses, No clubbing, cyanosis, or edema  PSYCH: NOn verbal  NEUROLOGY: non-focal  SKIN: No rashes or lesions    LABS:  CAPILLARY BLOOD GLUCOSE                              12.3   12.69 )-----------( 36       ( 30 Dec 2018 04:50 )             35.0     12-30    141  |  105  |  45<H>  ----------------------------<  120<H>  4.0   |  11<L>  |  1.12    Ca    9.0      30 Dec 2018 04:50  Phos  3.0     12-30  Mg     2.4     12-30    TPro  5.1<L>  /  Alb  2.0<L>  /  TBili  14.0<H>  /  DBili  x   /  AST  1068<H>  /  ALT  687<H>  /  AlkPhos  304<H>  12-30              RADIOLOGY & ADDITIONAL TESTS:    Imaging Personally Reviewed:    Consultant(s) Notes Reviewed:      Care Discussed with Consultants/Other Providers:

## 2019-01-01 NOTE — PROVIDER CONTACT NOTE (OTHER) - ACTION/TREATMENT ORDERED:
Ok to given pain medication prn
Ok to hold medication at this time
same as above
Will continue to monitor.

## 2019-01-01 NOTE — PROGRESS NOTE ADULT - PROBLEM SELECTOR PLAN 1
Severe sepsis persists with elevated lactate in serum.  Lactate level is worsening.
Severe sepsis persists with elevated lactate in serum.  Viremia noted on blood. Started on Genciclovir
Blood culture show GNR. Culture shows Kleb in blood, E Coli in urine. Cont antibiotics. Severe sepsis persists with elevated lactate in serum
Blood culture show GNR. Culture shows Kleb in blood, E Coli in urine. Cont antibiotics. Severe sepsis persists with elevated lactate in serum
Blood culture show GNR. Culture shows Kleb in blood, E Coli in urine. Cont antibiotics. Severe sepsis persists with elevated lactate in serum.  Viremia noted on blood. Started on Genciclovir
Blood culture show GNR. Culture shows Kleb in blood, E Coli in urine. Cont antibiotics. Severe sepsis persists with elevated lactate in serum.  Viremia noted on blood. Started on Genciclovir
Blood culture show GNR. ID eval noted
No further DMT at this time as per onc  C/w supportive care.
No further DMT at this time as per onc  C/w supportive care.
No further DMT at this time as per onc  S/p meeting with oncology 12/28 as family was seeking meeting for treatment  As per oncology patient has poor functional status and is not a candidate for treatment at this time. Family reports that there was hope in that if she gets better she will receive treatment. They understand cancer is progressing.   C/w supportive care
Persistent
Severe sepsis persists with elevated lactate in serum.  Lactate level is worsening.
Severe sepsis persists with elevated lactate in serum.  Lactate level is worsening.
Severe sepsis persists Lactate level is worsening.
Blood culture show GNR. Culture shows Kelb in blood, E Coli n urine.

## 2019-01-01 NOTE — PROGRESS NOTE ADULT - GASTROINTESTINAL DETAILS
nontender/soft
soft
soft/nontender/no distention
no distention/soft/nontender
soft/no distention
soft/no distention

## 2019-01-01 NOTE — PROGRESS NOTE ADULT - PROVIDER SPECIALTY LIST ADULT
Heme/Onc
Infectious Disease
Internal Medicine
Palliative Care
Infectious Disease
Internal Medicine
Heme/Onc
Internal Medicine
Heme/Onc
Heme/Onc
Internal Medicine
Heme/Onc

## 2019-01-01 NOTE — PROGRESS NOTE ADULT - PROBLEM SELECTOR PROBLEM 8
Medication management

## 2019-01-01 NOTE — CONSULT NOTE ADULT - ASSESSMENT
64 yo F w pmhx of Stage IV breast ca s/p 6 mo of chemo now with mets to liver, brain p/w sepsis now with declining mental status and metabolic acidosis       #Metabolic Acidosis in the setting of metastatic disease  - Extensive conversation with the patient's son Nicko, daughter in law Kamala, grandson all present at bedside and oldest son on the phone including poor prognosis:  Family would like comfort measures including pain and respiratory management including oxygen by mask or nasal cannula but no intubation/mechanical ventilation and no chest compressions. They understand that pain medications and other medications that can make the patient comfortable can lead to decrease in respiratory drive. They would like to continue with medical management with antibiotics, IV fluids, nebulizer treatments.  Their main goal is to make sure that their mother does not feel pain or distress.  - Discussed with the NP Lavonne, recommend  reaching out to Palliative care for help with orders for comfort measures - last morphine dose was 8 hrs ago though patient in distress but not able to verbalize pain. Would recommend PRN orders for respiratory distress or labored breathing including ativan.   - Pt made DNR/DNI, reconfirmed with the family present in room.   - Management otherwise as per oncology and primary team     Patient not a MICU candidate at this time. Please consider hospice referral.     Augusta Benoit MD   PGY 3   [facility]U [id number] 62 yo F w pmhx of Stage IV breast ca s/p 6 mo of chemo now with mets to liver, brain p/w sepsis now with declining mental status and metabolic acidosis       #Metabolic Acidosis in the setting of metastatic disease  - Extensive conversation with the patient's son Nicko, daughter in law Kamala, grandson all present at bedside and oldest son on the phone including poor prognosis:  Family would like comfort measures including pain and respiratory management including oxygen by mask or nasal cannula but no intubation/mechanical ventilation and no chest compressions. They understand that pain medications and other medications that can make the patient comfortable can lead to decrease in respiratory drive. They would like to continue with medical management with antibiotics, IV fluids, nebulizer treatments.  Their main goal is to make sure that their mother does not feel pain or distress.  - Discussed with the NP Lavonne, recommend  reaching out to Palliative care for help with orders for comfort measures - last morphine dose was 8 hrs ago though patient in distress but not able to verbalize pain. Would recommend PRN orders for respiratory distress or labored breathing including ativan.   - Pt DNR/DNI, reconfirmed with the family present in room.   - Management otherwise as per oncology and primary team     Patient not a MICU candidate at this time. Please consider hospice referral.     Augusta Benoit MD   PGY 3   MICU [id number]

## 2019-01-01 NOTE — PROGRESS NOTE ADULT - NEGATIVE GENERAL SYMPTOMS
no fever/no chills
no fever/no chills
no chills/no fever
no chills/no fever
no fever/no chills

## 2019-01-01 NOTE — CHART NOTE - NSCHARTNOTEFT_GEN_A_CORE
D/w Dr Phillips/ Dr bolton and Dr Sweeney regarding steroids- no urgent indication to start steroids now- Will Hold off on steroids.

## 2019-01-01 NOTE — PROGRESS NOTE ADULT - PROBLEM SELECTOR PLAN 7
-monitor BP, VS q4h in the setting of SIRS
Patient is Full Code  Family meeting again done at bedside with patients son, daughter in law, and grandchildren. Though they are practically able to understand disease progression and despite efforts of oncology discussing that patient is not a candidate for tx, surrogates still have strong tate in a miracle for things to turn around. Please refer to [facility] note.   Full Code.

## 2019-01-01 NOTE — GOALS OF CARE CONVERSATION - PERSONAL ADVANCE DIRECTIVE - CONVERSATION DETAILS
Extensive conversation with the family including oldest son on the phone. Family initially wanted everything done but did not have clear understanding of what intubation and CPR involves. After explanation and discussion of the patient's prognosis, family unanimously agrees to DNR/DNI. They would like to continue with antibiotics, IV fluids. They would like measures to increase comfort including morphine and/or ativan. They do not want aggressive measures of intubation, invasive central lines, or chest compression. See MICU note from 1/1/2019 as well.
Had  a lengthy discussion with Son Nicko, EDUARDO Wray , Family friend Aissatou and son and another grandson regarding Goals of care. Explained in detail that pts overall condition is  declining and worsening. Pt with agonal breathing and very lethargic- No role for chemo.  Family has been having extensive discussion with health care team and have been discussing hospice and comfort care. Family is not willing to agree to comfort care.  Per DIL and son" She is a strong woman and is a fighter and will fight till the end"  Family wants pt to be a Full code and aggressive treatment including Intubation and life support.  Updated DR Sweeney and Dr bolton.
Palliative care was introduced to the family as well as our role in patients management.   Patients daughter was very knowledgeable and straightforward. Patients disease progression and prognosis was discussed of days to weeks. This was difficult news for the family, although they did understand practically the cancer has spread and the patients body is weak and prone to infection. Family understands that performing CPR and intubating a patient in this stage of cancer may do more harm than good. The patient has a large family consisting of 3 birth children, multiple adopted children, and multiple grandchildren. They will take the information provided to them today and discuss amongst themselves:  - code status  - abx course   - comfort management with focus on symptomatic care    Will follow up family's decisions on Wednesday.  Patient at this time remains FULL CODE. C/w antibiotics.     Edyta Otero MD  Palliative Fellow.
Palliative was at bedside with patient and family.  Discussed their understanding of the meeting with oncology. Aissatou reported that she had "hope" and that there was a chance the patient may be able to get treatment should she get better, but understands that may also be  "slim to none." Patients cancer progression was discussed in terms of impaired liver function. Medical questions about thrombocytopenia, DIC, hepatic encephalopathy, poor nutritional status, sepsis with lactate of 11, and edema were all discussed and counseled on.    Family seems to be understanding that the patient is becoming more and more terminal, however, hold on to prayer and hope that our dialogue of the patients prognosis is understandably wrong. The grandchildren understand the prognosis. The patients children are in a little denial.    They agree with the pain management at this time and understand she may need continuous pain management in the future.  Patient remains full code.    Edyta Otero MD  Palliative fellow

## 2019-01-01 NOTE — PROGRESS NOTE ADULT - REASON FOR ADMISSION
Weakness

## 2019-01-01 NOTE — PROGRESS NOTE ADULT - PROBLEM SELECTOR PROBLEM 7
Essential hypertension
Encounter for palliative care
Essential hypertension

## 2019-01-01 NOTE — PROGRESS NOTE ADULT - PROBLEM SELECTOR PROBLEM 6
Slow transit constipation
Advanced care planning/counseling discussion
Slow transit constipation
Encounter for palliative care

## 2019-01-01 NOTE — CHART NOTE - NSCHARTNOTEFT_GEN_A_CORE
Was called by primary team to give recommendations for pt Falguni Bell, pt had pain in the AM and was given Morphine to relief symptoms, during the day pt had significant medical decline with agonal breathing. MICU team was consulted as family's wishes originally were to continue with aggressive measures, after detailed discussion with team decision was made to make pt DNR / DNI. If families goal is comfort recommend continuing Morphine 1mg every 2 hours as needed for pain. Morphine 1mg as needed SOB or labored breathing. Robinul 0.4mg IV q/4  6 hrs as needed for upper airway secretions and Tylenol suppository as needed for fever. Please call if any further questions or concerns regarding symptom management.

## 2019-01-01 NOTE — PROGRESS NOTE ADULT - NEGATIVE RESPIRATORY AND THORAX SYMPTOMS
no dyspnea/no cough
no dyspnea/no cough
more laboured
no cough/no dyspnea
no dyspnea/no cough
no dyspnea/no cough
no cough/no dyspnea
no cough/no dyspnea

## 2019-01-01 NOTE — CHART NOTE - NSCHARTNOTEFT_GEN_A_CORE
Late Note entry:  MICU reconsulted since family wants to pursue with full CODE and aggressive measures. Will await MICU in put

## 2019-01-01 NOTE — PROGRESS NOTE ADULT - RS GEN PE MLT RESP DETAILS PC
no rhonchi/breath sounds equal/no rales
breathing is more labored
no rales/breath sounds equal/no rhonchi
no rales/no rhonchi/breath sounds equal
breath sounds equal/no rales/no rhonchi
breath sounds equal/no rales/no rhonchi
no rales/no rhonchi/breath sounds equal
no rhonchi/no rales/breath sounds equal

## 2019-01-01 NOTE — CONSULT NOTE ADULT - ATTENDING COMMENTS
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  She has had continued decline in mental and functional status and development of metabolic acidosis.  Pt seen and examined with resident; extensive family at bedside  Agree with PE above  After extensive conversation with family as described above, pt has been made DNR/DNI and family desires comfort care only with pt able to remain in present room if possible  Agree with A/P above  Pt not a MICU candidate at this time
Patient seen and examined.  Agree with fellow note as above.

## 2019-01-02 NOTE — DISCHARGE NOTE FOR THE EXPIRED PATIENT - HOSPITAL COURSE
63 F (ambulatory with assistance) PMHx HTN, stage IV breast Ca (liver, spine) (initially diagnosed 2015 at [facility], [place]) a/w SIRS, generalized weakness and elevated LFTs with bilirubin likely due to metastatic disease. Found to have extensive oral candidiasis.     Hospital Course:  SIRS (systemic inflammatory response syndrome).   -Likely due to metastatic disease burden  -No clinical evidence of infection  -High lactate likely reflects hepatic disfunction due to mets and overall tumor burden  -CT A/P - S/P R mastectomy. Numerous hepatic and pulmonary metastases. Destructive lytic lesion of the L4 vertebral body. Correlation with prior imaging is recommended. Myomatous uterus. A heterogeneous low-attenuation area in the fundal region may be secondary to myomatous involvement versus abnormal thickened endometrium.. Pelvic and transvaginal ultrasound may be obtained as clinically indicated. A mass is noted in the left breast the inner left breast. Correlation with any prior imaging is suggested..  -BCx E. coli, repeat BCx NTD  -UA positive, UCx K. pneumonia   -ID Cx  -Zosyn    - MRI brain: Extra-axial lesion is seen involving left parietal region which could be      compatible meningioma versus dural metastasis. 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 Osseous metastasis as described above.    Metastatic breast cancer.    -Oncology Cx  -palliative care consulted     Elevated liver enzymes.    -Likely due to liver mets  -Hepatitis panel negative   -Zofran/Reglan PRN     Generalized weakness.    -Likely due to SIRS and metastatic tumor burden  -Fall precautions     Oral candidiasis.    -Reports no odynophagia at this time  -Nystatin q6h  -Monitor for improvement   -HOLD PO Fluconazole given likely extensive metastatic involvement of the liver.     Slow transit constipation.   -Senna and Miralax.    Essential hypertension.  -Monitor BP      PPX measure  -Lovenox     1/2/19 Notified by RN that pt has no pulse. Pt seen at bedside. Pt unresponsive to verbal, tactile and noxious stimuli. Pupils fixed and dilated. No palpable carotid and radial pulse. No breath sound. Son Nicko notified. Family refused autopsy. Death pronounced at 6:25 am. Pt DNR/DNI on comfort measures. Attending notified.

## 2019-06-24 NOTE — DIETITIAN INITIAL EVALUATION ADULT. - PROBLEM SELECTOR PROBLEM 8
Medication management Elliptical Excision Additional Text (Leave Blank If You Do Not Want): The margin was drawn around the clinically apparent lesion.  An elliptical shape was then drawn on the skin incorporating the lesion and margins.  Incisions were then made along these lines to the appropriate tissue plane and the lesion was extirpated.

## 2020-05-14 NOTE — CONSULT NOTE ADULT - PROBLEM SELECTOR PROBLEM 2
I have personally performed a face to face diagnostic evaluation on this patient. I have reviewed the ACP note and agree with the history, exam and plan of care, except as noted.
Sepsis

## 2020-10-19 NOTE — PATIENT PROFILE ADULT - NSPROMUTPARTICIPCAREFT_GEN_A_NUR
How Severe Is It?: moderate Is This A New Presentation, Or A Follow-Up?: Follow Up Humira Additional History: Patient states her psoriasis has been well controlled with the use of Humira; however, due to COVID and the assistance she gives her  due to his Parkinson’s disease she decided to discontinue the use of Humira approximately one month ago.   She feels that her  is at greater risk of infection due to his underlying Parkinson’s as well as his new bladder. She is concerned that Humira will make her more susceptible to an infection which she could potentially transmit to her . She would prefer to manage her skin disease with topical steroids at this time. She denies a flare of disease since d/c of Humira.  They are planning to move to [facility] in the next several months. n/a

## 2021-03-18 NOTE — PROGRESS NOTE ADULT - PROBLEM SELECTOR PLAN 8
Unable to recall home medications;  -will request assistance from med-rec pharmacist to obtain current home meds
75
Unable to recall home medications;  -will request assistance from med-rec pharmacist to obtain current home meds

## 2022-10-06 NOTE — PHYSICAL THERAPY INITIAL EVALUATION ADULT - RANGE OF MOTION EXAMINATION, REHAB EVAL
bilateral lower extremity ROM was WFL (within functional limits)/bilateral upper extremity ROM was WFL (within functional limits) Alar Island Pedicle Flap Text: The defect edges were debeveled with a #15 scalpel blade.  Given the location of the defect, shape of the defect and the proximity to the alar rim an island pedicle advancement flap was deemed most appropriate.  Using a sterile surgical marker, an appropriate advancement flap was drawn incorporating the defect, outlining the appropriate donor tissue and placing the expected incisions within the nasal ala running parallel to the alar rim. The area thus outlined was incised with a #15 scalpel blade.  The skin margins were undermined minimally to an appropriate distance in all directions around the primary defect and laterally outward around the island pedicle utilizing iris scissors.  There was minimal undermining beneath the pedicle flap. 3

## 2024-03-18 NOTE — PHYSICAL THERAPY INITIAL EVALUATION ADULT - SITTING BALANCE: DYNAMIC
- c/w Home Valium (Confirmed via [employer])  - pt is very tearful, anxious and frustrated about current clinical status  - emotional support provided  - she declines Behavioural Health consult poor balance

## 2025-02-19 NOTE — PHYSICAL THERAPY INITIAL EVALUATION ADULT - MANUAL MUSCLE TESTING RESULTS, REHAB EVAL
Copied from CRM #[id number]. Topic: MW Medication/Rx - MW Patient Calling for RX Needs  >> Feb 19, 2025 10:48 AM Neris PIERRE wrote:  [employer] called with Question about medication during working hrs.   Selected 'Wrap Up CRM' and created new Telephone Encounter after clicking 'Convert to Clinical Call'. Selected reason for call 'Medication Issue'. Sent Med template and routed as routine priority per Care Site Dept KB page for Med Refill Working Hrs support to appropriate clinical pool. Readback full message.-- DO NOT REPLY / DO NOT REPLY ALL --  -- This inbox is not monitored. If this was sent to the wrong provider or department, reroute message to P ECO Reroute pool. --  -- Message is from Engagement Center Operations (ECO) --      Message Type:  Medication Question or Concern    Medication Question:  PLEASE [facility]. They have a question about the fluconizale fax received  Preferred pharmacy verified and selected.      [employer] [phone]    Patient has been advised the message will be addressed within 2-3 business days.             < 3/5 throughout

## 2025-03-25 NOTE — DIETITIAN INITIAL EVALUATION ADULT. - SIGNS/SYMPTOMS
----- Message from Heladio Lou MD sent at 3/24/2025  8:01 PM CDT -----  Please let the patient know the MRI shows a tear in the lateral meniscus of the knee, inflamatory cyst on the outside or lateral side of the knee and wearing down of the cartilage under the knee cap. Recommend referral to orthopedics.  Route back after you make contact so I can order the referral   moderate fat and muscle wasting, 11% wt loss, suspected PO intake <75% needs > 1 month.

## 2025-04-05 NOTE — PROGRESS NOTE ADULT - PROBLEM SELECTOR PLAN 2
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 GOC to be discussed with palliative team. Yes

## 2025-04-29 NOTE — PROVIDER CONTACT NOTE (OTHER) - SITUATION
Pt is lethargic and unable to follow commands, held senna and nystatin [id number]:7-10 Days|| ||00\01||False;